# Patient Record
Sex: FEMALE | Race: WHITE | NOT HISPANIC OR LATINO
[De-identification: names, ages, dates, MRNs, and addresses within clinical notes are randomized per-mention and may not be internally consistent; named-entity substitution may affect disease eponyms.]

---

## 2017-01-03 ENCOUNTER — APPOINTMENT (OUTPATIENT)
Dept: PULMONOLOGY | Facility: CLINIC | Age: 82
End: 2017-01-03

## 2017-01-03 VITALS
OXYGEN SATURATION: 97 % | HEIGHT: 64 IN | SYSTOLIC BLOOD PRESSURE: 135 MMHG | HEART RATE: 82 BPM | DIASTOLIC BLOOD PRESSURE: 70 MMHG | BODY MASS INDEX: 30.9 KG/M2 | TEMPERATURE: 98.8 F | WEIGHT: 181 LBS

## 2017-01-03 DIAGNOSIS — R94.2 ABNORMAL RESULTS OF PULMONARY FUNCTION STUDIES: ICD-10-CM

## 2017-01-12 ENCOUNTER — FORM ENCOUNTER (OUTPATIENT)
Age: 82
End: 2017-01-12

## 2017-01-13 ENCOUNTER — OUTPATIENT (OUTPATIENT)
Dept: OUTPATIENT SERVICES | Facility: HOSPITAL | Age: 82
LOS: 1 days | End: 2017-01-13
Payer: MEDICARE

## 2017-01-13 DIAGNOSIS — R06.02 SHORTNESS OF BREATH: ICD-10-CM

## 2017-01-13 PROCEDURE — 93320 DOPPLER ECHO COMPLETE: CPT | Mod: 26

## 2017-01-13 PROCEDURE — 93350 STRESS TTE ONLY: CPT | Mod: 26

## 2017-01-13 PROCEDURE — 93351 STRESS TTE COMPLETE: CPT

## 2017-01-13 PROCEDURE — 93018 CV STRESS TEST I&R ONLY: CPT

## 2017-01-13 PROCEDURE — 93325 DOPPLER ECHO COLOR FLOW MAPG: CPT | Mod: 26

## 2017-01-13 PROCEDURE — 93016 CV STRESS TEST SUPVJ ONLY: CPT

## 2017-01-17 ENCOUNTER — RESULT REVIEW (OUTPATIENT)
Age: 82
End: 2017-01-17

## 2017-01-17 VITALS
SYSTOLIC BLOOD PRESSURE: 179 MMHG | HEART RATE: 84 BPM | OXYGEN SATURATION: 94 % | RESPIRATION RATE: 16 BRPM | TEMPERATURE: 98 F | WEIGHT: 182.1 LBS | HEIGHT: 64 IN | DIASTOLIC BLOOD PRESSURE: 87 MMHG

## 2017-01-17 RX ORDER — SITAGLIPTIN 50 MG/1
1 TABLET, FILM COATED ORAL
Qty: 0 | Refills: 0 | COMMUNITY

## 2017-01-17 RX ORDER — CHLORHEXIDINE GLUCONATE 213 G/1000ML
1 SOLUTION TOPICAL ONCE
Qty: 0 | Refills: 0 | Status: DISCONTINUED | OUTPATIENT
Start: 2017-01-18 | End: 2017-01-19

## 2017-01-17 NOTE — H&P ADULT. - ASSESSMENT
82 y/o F former smoker(quit 20 years ago), strong family h/o heart disease(daughter recently passed away from MI), HLD, DM, Breast CA s/p R lumpectomy in 2013, Uterine CA s/p ZEENAT in past, TIA in 2013(no residual deficits) who presented to her Cardiologist Dr. Stanton with increased PAYNE over past month.  Pt reports symptoms began 2-3 years ago and she had NST at that time which was normal but symptoms have worsened.  She was instructed to use an inhaler but has not picked inhaler up.  She reports symptoms occur with ambulation of 1 flight of stairs and are relieved with rest.  She denies any chest pain, palpitations, LE edema, PND, orthopnea.  She underwent a CTA PE protocol and LE duplexes last month which were negative for DVT/PE.  On 1/13/17 she underwent a Stress Echo which revealed LV wall motion nml, EF 60%, with LVEF to 45% with stress and stress induced wall motion with akinesis of apex, apical septum and mid inferoseptum and mid anteroseptal wall highly suggestive of exercise related ischemia in LAD distribution. In light of patient's CCS Class III Anginal Equivalent symptoms in setting of abnormal Stress Echo she now presents for recommended Cardiac Catheterization with intervention if clinically indicated.    Pt doesn't take Aspirin or Plavix at home. Pt does not take any medications for blood pressure. Pt's labs stable. Pt loaded with Aspirin 325mg and Plavix 600mg prior to cardiac cath. 82 y/o F former smoker(quit 20 years ago), strong family h/o heart disease(daughter recently passed away from MI), HLD, DM, Breast CA s/p R lumpectomy in 2013, Uterine CA s/p ZEENAT in past, TIA in 2013(no residual deficits) who presented to her Cardiologist Dr. Stanton with increased PAYNE over past month.  Pt reports symptoms began 2-3 years ago and she had NST at that time which was normal but symptoms have worsened.  She was instructed to use an inhaler but has not picked inhaler up.  She reports symptoms occur with ambulation of 1 flight of stairs and are relieved with rest.  She denies any chest pain, palpitations, LE edema, PND, orthopnea.  She underwent a CTA PE protocol and LE duplexes last month which were negative for DVT/PE.  On 1/13/17 she underwent a Stress Echo which revealed LV wall motion nml, EF 60%, with LVEF to 45% with stress and stress induced wall motion with akinesis of apex, apical septum and mid inferoseptum and mid anteroseptal wall highly suggestive of exercise related ischemia in LAD distribution. In light of patient's CCS Class III Anginal Equivalent symptoms in setting of abnormal Stress Echo she now presents for recommended Cardiac Catheterization with intervention if clinically indicated.    Pt doesn't take Aspirin or Plavix at home. Pt does not take any medications for blood pressure. Pt's labs stable. Pt loaded with Aspirin 325mg and Plavix 600mg prior to cardiac cath.    ASA III, Mallampati III

## 2017-01-17 NOTE — H&P ADULT. - PMH
Breast CA  s/p lumpectomy on right  Cataracts, bilateral    DM (diabetes mellitus)    HLD (hyperlipidemia)

## 2017-01-17 NOTE — H&P ADULT. - FAMILY HISTORY
Father  Still living? No  Family history of heart attack, Age at diagnosis: 71-80     Child  Still living? No  Family history of heart attack, Age at diagnosis: 51-60     Sibling  Still living? Yes, Estimated age: Age Unknown  Family history of heart valve abnormality, Age at diagnosis: 71-80

## 2017-01-18 ENCOUNTER — INPATIENT (INPATIENT)
Facility: HOSPITAL | Age: 82
LOS: 0 days | Discharge: HOME | DRG: 247 | End: 2017-01-19
Attending: INTERNAL MEDICINE | Admitting: INTERNAL MEDICINE
Payer: COMMERCIAL

## 2017-01-18 DIAGNOSIS — Z90.11 ACQUIRED ABSENCE OF RIGHT BREAST AND NIPPLE: Chronic | ICD-10-CM

## 2017-01-18 DIAGNOSIS — Z90.710 ACQUIRED ABSENCE OF BOTH CERVIX AND UTERUS: Chronic | ICD-10-CM

## 2017-01-18 LAB
ALBUMIN SERPL ELPH-MCNC: 3.8 G/DL — SIGNIFICANT CHANGE UP (ref 3.4–5)
ALP SERPL-CCNC: 80 U/L — SIGNIFICANT CHANGE UP (ref 40–120)
ALT FLD-CCNC: 22 U/L — SIGNIFICANT CHANGE UP (ref 12–42)
ANION GAP SERPL CALC-SCNC: 7 MMOL/L — LOW (ref 9–16)
APTT BLD: 32.1 SEC — SIGNIFICANT CHANGE UP (ref 27.5–37.4)
AST SERPL-CCNC: 20 U/L — SIGNIFICANT CHANGE UP (ref 15–37)
BASOPHILS NFR BLD AUTO: 0.5 % — SIGNIFICANT CHANGE UP (ref 0–2)
BILIRUB SERPL-MCNC: 0.3 MG/DL — SIGNIFICANT CHANGE UP (ref 0.2–1.2)
BUN SERPL-MCNC: 26 MG/DL — HIGH (ref 7–23)
CALCIUM SERPL-MCNC: 9 MG/DL — SIGNIFICANT CHANGE UP (ref 8.5–10.5)
CHLORIDE SERPL-SCNC: 107 MMOL/L — SIGNIFICANT CHANGE UP (ref 96–108)
CHOLEST SERPL-MCNC: 171 MG/DL — SIGNIFICANT CHANGE UP
CO2 SERPL-SCNC: 25 MMOL/L — SIGNIFICANT CHANGE UP (ref 22–31)
CREAT SERPL-MCNC: 0.94 MG/DL — SIGNIFICANT CHANGE UP (ref 0.5–1.3)
CRP SERPL-MCNC: <0.29 MG/DL — SIGNIFICANT CHANGE UP
EOSINOPHIL NFR BLD AUTO: 1.2 % — SIGNIFICANT CHANGE UP (ref 0–6)
GLUCOSE SERPL-MCNC: 206 MG/DL — HIGH (ref 70–99)
HBA1C BLD-MCNC: 7.7 % — HIGH (ref 4.8–5.6)
HCT VFR BLD CALC: 40.3 % — SIGNIFICANT CHANGE UP (ref 34.5–45)
HDLC SERPL-MCNC: 80 MG/DL — SIGNIFICANT CHANGE UP
HGB BLD-MCNC: 13.2 G/DL — SIGNIFICANT CHANGE UP (ref 11.5–15.5)
INR BLD: 0.98 — SIGNIFICANT CHANGE UP (ref 0.88–1.16)
LIPID PNL WITH DIRECT LDL SERPL: 74 MG/DL — SIGNIFICANT CHANGE UP
LYMPHOCYTES # BLD AUTO: 21.6 % — SIGNIFICANT CHANGE UP (ref 13–44)
MCHC RBC-ENTMCNC: 29.1 PG — SIGNIFICANT CHANGE UP (ref 27–34)
MCHC RBC-ENTMCNC: 32.8 G/DL — SIGNIFICANT CHANGE UP (ref 32–36)
MCV RBC AUTO: 88.8 FL — SIGNIFICANT CHANGE UP (ref 80–100)
MONOCYTES NFR BLD AUTO: 4.6 % — SIGNIFICANT CHANGE UP (ref 2–14)
NEUTROPHILS NFR BLD AUTO: 72.1 % — SIGNIFICANT CHANGE UP (ref 43–77)
PLATELET # BLD AUTO: 311 K/UL — SIGNIFICANT CHANGE UP (ref 150–400)
POTASSIUM SERPL-MCNC: 4.4 MMOL/L — SIGNIFICANT CHANGE UP (ref 3.5–5.3)
POTASSIUM SERPL-SCNC: 4.4 MMOL/L — SIGNIFICANT CHANGE UP (ref 3.5–5.3)
PROT SERPL-MCNC: 7.4 G/DL — SIGNIFICANT CHANGE UP (ref 6.4–8.2)
PROTHROM AB SERPL-ACNC: 10.9 SEC — SIGNIFICANT CHANGE UP (ref 10–13.1)
RBC # BLD: 4.54 M/UL — SIGNIFICANT CHANGE UP (ref 3.8–5.2)
RBC # FLD: 12.7 % — SIGNIFICANT CHANGE UP (ref 10.3–16.9)
SODIUM SERPL-SCNC: 139 MMOL/L — SIGNIFICANT CHANGE UP (ref 135–145)
TOTAL CHOLESTEROL/HDL RATIO MEASUREMENT: 2.1 RATIO — SIGNIFICANT CHANGE UP
TRIGL SERPL-MCNC: 86 MG/DL — SIGNIFICANT CHANGE UP
WBC # BLD: 8.2 K/UL — SIGNIFICANT CHANGE UP (ref 3.8–10.5)
WBC # FLD AUTO: 8.2 K/UL — SIGNIFICANT CHANGE UP (ref 3.8–10.5)

## 2017-01-18 PROCEDURE — 93458 L HRT ARTERY/VENTRICLE ANGIO: CPT | Mod: 26,XU

## 2017-01-18 PROCEDURE — 92928 PRQ TCAT PLMT NTRAC ST 1 LES: CPT | Mod: LD

## 2017-01-18 PROCEDURE — 93010 ELECTROCARDIOGRAM REPORT: CPT

## 2017-01-18 RX ORDER — GLUCAGON INJECTION, SOLUTION 0.5 MG/.1ML
1 INJECTION, SOLUTION SUBCUTANEOUS ONCE
Qty: 0 | Refills: 0 | Status: DISCONTINUED | OUTPATIENT
Start: 2017-01-18 | End: 2017-01-19

## 2017-01-18 RX ORDER — SODIUM CHLORIDE 9 MG/ML
1000 INJECTION, SOLUTION INTRAVENOUS
Qty: 0 | Refills: 0 | Status: DISCONTINUED | OUTPATIENT
Start: 2017-01-18 | End: 2017-01-19

## 2017-01-18 RX ORDER — ASPIRIN/CALCIUM CARB/MAGNESIUM 324 MG
81 TABLET ORAL DAILY
Qty: 0 | Refills: 0 | Status: DISCONTINUED | OUTPATIENT
Start: 2017-01-19 | End: 2017-01-19

## 2017-01-18 RX ORDER — CLOPIDOGREL BISULFATE 75 MG/1
600 TABLET, FILM COATED ORAL ONCE
Qty: 0 | Refills: 0 | Status: COMPLETED | OUTPATIENT
Start: 2017-01-18 | End: 2017-01-18

## 2017-01-18 RX ORDER — DEXTROSE 50 % IN WATER 50 %
1 SYRINGE (ML) INTRAVENOUS ONCE
Qty: 0 | Refills: 0 | Status: DISCONTINUED | OUTPATIENT
Start: 2017-01-18 | End: 2017-01-19

## 2017-01-18 RX ORDER — LABETALOL HCL 100 MG
10 TABLET ORAL ONCE
Qty: 0 | Refills: 0 | Status: COMPLETED | OUTPATIENT
Start: 2017-01-18 | End: 2017-01-18

## 2017-01-18 RX ORDER — SODIUM CHLORIDE 9 MG/ML
500 INJECTION INTRAMUSCULAR; INTRAVENOUS; SUBCUTANEOUS
Qty: 0 | Refills: 0 | Status: DISCONTINUED | OUTPATIENT
Start: 2017-01-18 | End: 2017-01-18

## 2017-01-18 RX ORDER — CLOPIDOGREL BISULFATE 75 MG/1
75 TABLET, FILM COATED ORAL DAILY
Qty: 0 | Refills: 0 | Status: DISCONTINUED | OUTPATIENT
Start: 2017-01-19 | End: 2017-01-19

## 2017-01-18 RX ORDER — ATORVASTATIN CALCIUM 80 MG/1
10 TABLET, FILM COATED ORAL AT BEDTIME
Qty: 0 | Refills: 0 | Status: DISCONTINUED | OUTPATIENT
Start: 2017-01-18 | End: 2017-01-19

## 2017-01-18 RX ORDER — DEXTROSE 50 % IN WATER 50 %
25 SYRINGE (ML) INTRAVENOUS ONCE
Qty: 0 | Refills: 0 | Status: DISCONTINUED | OUTPATIENT
Start: 2017-01-18 | End: 2017-01-19

## 2017-01-18 RX ORDER — SODIUM CHLORIDE 9 MG/ML
500 INJECTION INTRAMUSCULAR; INTRAVENOUS; SUBCUTANEOUS
Qty: 0 | Refills: 0 | Status: DISCONTINUED | OUTPATIENT
Start: 2017-01-18 | End: 2017-01-19

## 2017-01-18 RX ORDER — ASPIRIN/CALCIUM CARB/MAGNESIUM 324 MG
325 TABLET ORAL ONCE
Qty: 0 | Refills: 0 | Status: COMPLETED | OUTPATIENT
Start: 2017-01-18 | End: 2017-01-18

## 2017-01-18 RX ORDER — INSULIN LISPRO 100/ML
VIAL (ML) SUBCUTANEOUS
Qty: 0 | Refills: 0 | Status: DISCONTINUED | OUTPATIENT
Start: 2017-01-18 | End: 2017-01-19

## 2017-01-18 RX ORDER — DEXTROSE 50 % IN WATER 50 %
12.5 SYRINGE (ML) INTRAVENOUS ONCE
Qty: 0 | Refills: 0 | Status: DISCONTINUED | OUTPATIENT
Start: 2017-01-18 | End: 2017-01-19

## 2017-01-18 RX ADMIN — SODIUM CHLORIDE 50 MILLILITER(S): 9 INJECTION INTRAMUSCULAR; INTRAVENOUS; SUBCUTANEOUS at 09:19

## 2017-01-18 RX ADMIN — Medication 10 MILLIGRAM(S): at 12:02

## 2017-01-18 RX ADMIN — CLOPIDOGREL BISULFATE 600 MILLIGRAM(S): 75 TABLET, FILM COATED ORAL at 09:19

## 2017-01-18 RX ADMIN — SODIUM CHLORIDE 75 MILLILITER(S): 9 INJECTION INTRAMUSCULAR; INTRAVENOUS; SUBCUTANEOUS at 12:03

## 2017-01-18 RX ADMIN — Medication 325 MILLIGRAM(S): at 09:19

## 2017-01-18 NOTE — PROGRESS NOTE ADULT - SUBJECTIVE AND OBJECTIVE BOX
Procedure: LHC, SEAN of mLAD  Indication: CAD, +EST  Complication: none    Result:  1) 3 V CAD ( 90%mLAD, 50% oLCX, 60% oRCA)  2) Nl LVF EF 60% LVEDP 11  3) Successful SEAN of mLAD    Plan: Admit for IV hydration given GFR<60 and advanced age.  Plavix + ASA x 1 year. To f/u with Dr. Stanton.

## 2017-01-19 ENCOUNTER — TRANSCRIPTION ENCOUNTER (OUTPATIENT)
Age: 82
End: 2017-01-19

## 2017-01-19 VITALS — RESPIRATION RATE: 15 BRPM | SYSTOLIC BLOOD PRESSURE: 154 MMHG | HEART RATE: 89 BPM | DIASTOLIC BLOOD PRESSURE: 63 MMHG

## 2017-01-19 DIAGNOSIS — I10 ESSENTIAL (PRIMARY) HYPERTENSION: ICD-10-CM

## 2017-01-19 DIAGNOSIS — E11.59 TYPE 2 DIABETES MELLITUS WITH OTHER CIRCULATORY COMPLICATIONS: ICD-10-CM

## 2017-01-19 DIAGNOSIS — E78.01 FAMILIAL HYPERCHOLESTEROLEMIA: ICD-10-CM

## 2017-01-19 DIAGNOSIS — I25.10 ATHEROSCLEROTIC HEART DISEASE OF NATIVE CORONARY ARTERY WITHOUT ANGINA PECTORIS: ICD-10-CM

## 2017-01-19 LAB
ANION GAP SERPL CALC-SCNC: 11 MMOL/L — SIGNIFICANT CHANGE UP (ref 9–16)
BUN SERPL-MCNC: 20 MG/DL — SIGNIFICANT CHANGE UP (ref 7–23)
CALCIUM SERPL-MCNC: 8.3 MG/DL — LOW (ref 8.5–10.5)
CHLORIDE SERPL-SCNC: 106 MMOL/L — SIGNIFICANT CHANGE UP (ref 96–108)
CO2 SERPL-SCNC: 23 MMOL/L — SIGNIFICANT CHANGE UP (ref 22–31)
CREAT SERPL-MCNC: 0.88 MG/DL — SIGNIFICANT CHANGE UP (ref 0.5–1.3)
GLUCOSE SERPL-MCNC: 158 MG/DL — HIGH (ref 70–99)
HCT VFR BLD CALC: 35 % — SIGNIFICANT CHANGE UP (ref 34.5–45)
HGB BLD-MCNC: 11.6 G/DL — SIGNIFICANT CHANGE UP (ref 11.5–15.5)
MAGNESIUM SERPL-MCNC: 1.6 MG/DL — SIGNIFICANT CHANGE UP (ref 1.6–2.4)
MCHC RBC-ENTMCNC: 29.1 PG — SIGNIFICANT CHANGE UP (ref 27–34)
MCHC RBC-ENTMCNC: 33.1 G/DL — SIGNIFICANT CHANGE UP (ref 32–36)
MCV RBC AUTO: 87.7 FL — SIGNIFICANT CHANGE UP (ref 80–100)
PLATELET # BLD AUTO: 262 K/UL — SIGNIFICANT CHANGE UP (ref 150–400)
POTASSIUM SERPL-MCNC: 4.3 MMOL/L — SIGNIFICANT CHANGE UP (ref 3.5–5.3)
POTASSIUM SERPL-SCNC: 4.3 MMOL/L — SIGNIFICANT CHANGE UP (ref 3.5–5.3)
RBC # BLD: 3.99 M/UL — SIGNIFICANT CHANGE UP (ref 3.8–5.2)
RBC # FLD: 12.4 % — SIGNIFICANT CHANGE UP (ref 10.3–16.9)
SODIUM SERPL-SCNC: 140 MMOL/L — SIGNIFICANT CHANGE UP (ref 135–145)
WBC # BLD: 8 K/UL — SIGNIFICANT CHANGE UP (ref 3.8–10.5)
WBC # FLD AUTO: 8 K/UL — SIGNIFICANT CHANGE UP (ref 3.8–10.5)

## 2017-01-19 PROCEDURE — 83735 ASSAY OF MAGNESIUM: CPT

## 2017-01-19 PROCEDURE — C1889: CPT

## 2017-01-19 PROCEDURE — 80053 COMPREHEN METABOLIC PANEL: CPT

## 2017-01-19 PROCEDURE — 85730 THROMBOPLASTIN TIME PARTIAL: CPT

## 2017-01-19 PROCEDURE — 86140 C-REACTIVE PROTEIN: CPT

## 2017-01-19 PROCEDURE — C1725: CPT

## 2017-01-19 PROCEDURE — 80048 BASIC METABOLIC PNL TOTAL CA: CPT

## 2017-01-19 PROCEDURE — 85610 PROTHROMBIN TIME: CPT

## 2017-01-19 PROCEDURE — 83036 HEMOGLOBIN GLYCOSYLATED A1C: CPT

## 2017-01-19 PROCEDURE — C1760: CPT

## 2017-01-19 PROCEDURE — C1874: CPT

## 2017-01-19 PROCEDURE — 36415 COLL VENOUS BLD VENIPUNCTURE: CPT

## 2017-01-19 PROCEDURE — 85027 COMPLETE CBC AUTOMATED: CPT

## 2017-01-19 PROCEDURE — C1769: CPT

## 2017-01-19 PROCEDURE — 85025 COMPLETE CBC W/AUTO DIFF WBC: CPT

## 2017-01-19 PROCEDURE — 93005 ELECTROCARDIOGRAM TRACING: CPT

## 2017-01-19 PROCEDURE — C1894: CPT

## 2017-01-19 PROCEDURE — 80061 LIPID PANEL: CPT

## 2017-01-19 PROCEDURE — C1887: CPT

## 2017-01-19 RX ORDER — MAGNESIUM OXIDE 400 MG ORAL TABLET 241.3 MG
400 TABLET ORAL ONCE
Qty: 0 | Refills: 0 | Status: COMPLETED | OUTPATIENT
Start: 2017-01-19 | End: 2017-01-19

## 2017-01-19 RX ORDER — ATORVASTATIN CALCIUM 80 MG/1
40 TABLET, FILM COATED ORAL AT BEDTIME
Qty: 0 | Refills: 0 | Status: DISCONTINUED | OUTPATIENT
Start: 2017-01-19 | End: 2017-01-19

## 2017-01-19 RX ORDER — ASPIRIN/CALCIUM CARB/MAGNESIUM 324 MG
1 TABLET ORAL
Qty: 30 | Refills: 11 | OUTPATIENT
Start: 2017-01-19 | End: 2018-01-13

## 2017-01-19 RX ORDER — CLOPIDOGREL BISULFATE 75 MG/1
1 TABLET, FILM COATED ORAL
Qty: 30 | Refills: 11 | OUTPATIENT
Start: 2017-01-19 | End: 2018-01-13

## 2017-01-19 RX ORDER — METFORMIN HYDROCHLORIDE 850 MG/1
1 TABLET ORAL
Qty: 0 | Refills: 0 | COMMUNITY

## 2017-01-19 RX ORDER — ATORVASTATIN CALCIUM 80 MG/1
1 TABLET, FILM COATED ORAL
Qty: 30 | Refills: 3 | OUTPATIENT
Start: 2017-01-19 | End: 2017-05-18

## 2017-01-19 RX ORDER — ATORVASTATIN CALCIUM 80 MG/1
1 TABLET, FILM COATED ORAL
Qty: 0 | Refills: 0 | COMMUNITY

## 2017-01-19 RX ADMIN — MAGNESIUM OXIDE 400 MG ORAL TABLET 400 MILLIGRAM(S): 241.3 TABLET ORAL at 09:46

## 2017-01-19 RX ADMIN — Medication 4: at 12:07

## 2017-01-19 RX ADMIN — Medication 81 MILLIGRAM(S): at 09:46

## 2017-01-19 RX ADMIN — CLOPIDOGREL BISULFATE 75 MILLIGRAM(S): 75 TABLET, FILM COATED ORAL at 09:46

## 2017-01-19 NOTE — DISCHARGE NOTE ADULT - MEDICATION SUMMARY - MEDICATIONS TO CHANGE
I will SWITCH the dose or number of times a day I take the medications listed below when I get home from the hospital:    metFORMIN 1000 mg oral tablet, extended release  -- 1 tab(s) by mouth once a day

## 2017-01-19 NOTE — DISCHARGE NOTE ADULT - MEDICATION SUMMARY - MEDICATIONS TO TAKE
I will START or STAY ON the medications listed below when I get home from the hospital:    aspirin 81 mg oral delayed release tablet  -- 1 tab(s) by mouth once a day  -- Indication: For Coronary Artery Disease    Januvia 100 mg oral tablet  -- 1 tab(s) by mouth once a day  -- Indication: For Diabetes    metFORMIN 1000 mg oral tablet, extended release  -- 1 tab(s) by mouth once a day.    Hold Metformin for 48 hours and restart on 1/21/17  -- Indication: For Diabetes    Lipitor 10 mg oral tablet  -- 1 tab(s) by mouth once a day  -- Indication: For Hyperlipidemia    clopidogrel 75 mg oral tablet  -- 1 tab(s) by mouth once a day  -- Indication: For Coronary Artery Disease

## 2017-01-19 NOTE — DISCHARGE NOTE ADULT - ADDITIONAL INSTRUCTIONS
Please follow up with Dr. Stanton in 1-2 weeks    You underwent a coronary angiogram and should wait 3 days before returning to ordinary activities. Do not drive for 2 days. Consult your doctor before returning to vigorous activity. You may return to work in 3-5 days.  You may shower once the dressing is removed, but avoid baths, hot tubs, or swimming for 5 days to prevent infection. If you notice bleeding from the site, hardening and pain at the site, drainage or redness from the site, coolness/paleness of the extremity, swelling, or fever, please call [phone]. Please continue your aspirin and plavix as prescribed unless otherwise indicated by your cardiologist. All of your prescriptions have been sent to the pharmacy. Please follow up with Dr. Griffin in 1-2 weeks. All of your needed prescriptions have been sent electronically to your pharmacy.

## 2017-01-19 NOTE — DISCHARGE NOTE ADULT - CARE PLAN
Principal Discharge DX:	CAD (coronary artery disease)  Goal:	continue Aspirin EC 81mg daily and Plavix 75mg daily  Instructions for follow-up, activity and diet:	You had underwent successful Angioplasty and placement of Drug Eluting Stent to your Left Anterior Descending   	Continue taking Aspirin 81mg daily and Plavix 75mg daily.  Do NOT stop these medications unless instructed to do so by your Cardiologist.   Continue other home medications.  Follow up with your Cardiologist in office in 1-2 weeks.  Secondary Diagnosis:	HLD (hyperlipidemia)  Secondary Diagnosis:	HTN (hypertension)  Secondary Diagnosis:	Type 2 diabetes mellitus with other circulatory complication Principal Discharge DX:	CAD (coronary artery disease)  Goal:	continue Aspirin EC 81mg daily and Plavix 75mg daily  Instructions for follow-up, activity and diet:	You had underwent successful Angioplasty and placement of Drug Eluting Stent to your Left Anterior Descending. Continue taking Aspirin 81mg daily and Plavix 75mg daily.  Do NOT stop these medications unless instructed to do so by your Cardiologist.   Continue other home medications.  Follow up with your Cardiologist in office in 1-2 weeks.  Secondary Diagnosis:	HLD (hyperlipidemia)  Goal:	Continue Atorvastatin 10mg daily  Secondary Diagnosis:	HTN (hypertension)  Instructions for follow-up, activity and diet:	You were found with Systolic Blood Pressure ranging 180's.  Continue to frank  Secondary Diagnosis:	Type 2 diabetes mellitus with other circulatory complication  Goal:	Hold Metformin for 48 hours and restart on 1/21/17.  Continue Januvia Principal Discharge DX:	CAD (coronary artery disease)  Goal:	continue Aspirin EC 81mg daily and Plavix 75mg daily  Instructions for follow-up, activity and diet:	You had underwent successful Angioplasty and placement of Drug Eluting Stent to your Left Anterior Descending. Continue taking Aspirin 81mg daily and Plavix 75mg daily.  Do NOT stop these medications unless instructed to do so by your Cardiologist.   Continue other home medications.  Follow up with your Cardiologist in office in 1-2 weeks.  Secondary Diagnosis:	HLD (hyperlipidemia)  Goal:	Your Atorvastatin was increased to Atorvastatin 4 0mg daily to protect your heart and prevent further plaque build up  Secondary Diagnosis:	HTN (hypertension)  Instructions for follow-up, activity and diet:	You were found with Systolic Blood Pressure ranging 170 - 180's.  You were not started on blood pressure medications at this time; however, it is recommended that you adhere to low salt, low fat and low cholesterol diet.  Please follow up with cardiologist in [place] in 1  Secondary Diagnosis:	Type 2 diabetes mellitus with other circulatory complication  Goal:	Hold Metformin for 48 hours and restart on 1/21/17.  Continue Januvia

## 2017-01-19 NOTE — DISCHARGE NOTE ADULT - CARE PROVIDER_API CALL
Ervin Stanton (MD), Cardiovascular Disease; Internal Medicine  [address] [place] [address]  Phone: [phone]  Fax: [phone]

## 2017-01-19 NOTE — DISCHARGE NOTE ADULT - NS MD DC FALL RISK RISK
For information on Fall & Injury Prevention, visit [email or web address]

## 2017-01-19 NOTE — PROGRESS NOTE ADULT - PROBLEM SELECTOR PLAN 4
BPs are running high here but as an outpt she has better BPs.  If they stay elevated would add an ACE I.

## 2017-01-19 NOTE — DISCHARGE NOTE ADULT - SECONDARY DIAGNOSIS.
HLD (hyperlipidemia) HTN (hypertension) Type 2 diabetes mellitus with other circulatory complication

## 2017-01-19 NOTE — DISCHARGE NOTE ADULT - HOSPITAL COURSE
84 y/o F former smoker(quit 20 years ago), strong family h/o heart disease(daughter recently passed away from MI), HLD, DM, Breast CA s/p R lumpectomy in 2013, Uterine CA s/p ZEENAT in past, TIA in 2013(no residual deficits) who presented to her Cardiologist Dr. Stanton with increased PAYNE over past month.  Pt reports symptoms began 2-3 years ago and she had NST at that time which was normal but symptoms have worsened.  She was instructed to use an inhaler but has not picked inhaler up.  She reports symptoms occur with ambulation of 1 flight of stairs and are relieved with rest.  She denies any chest pain, palpitations, LE edema, PND, orthopnea.  She underwent a CTA PE protocol and LE duplexes last month which were negative for DVT/PE.  On 1/13/17 she underwent a Stress Echo which revealed LV wall motion nml, EF 60%, with LVEF to 45% with stress and stress induced wall motion with akinesis of apex, apical septum and mid inferoseptum and mid anteroseptal wall highly suggestive of exercise related ischemia in LAD distribution.  Pt doesn't take Aspirin or Plavix at home. Pt does not take any medications for blood pressure.   Pt underwent Cardiac Cath 1/18/17:  SEAN mLAD with residual 50% oLcx, 60% oRCA).  Post cath BP elevated to 190/89(SBP>200 in room) given Labetalol 10mg IVP x 1 dose post(HR 80)-pt asx. BP improved to 140s/60s.    Post procedure pt remained asymptomatic denying CP, SOB, palpitations, N/V, bleeding/bruising from access site.  Pt was able to void and ambulate without difficulty.  Labs and vitals remained stable overnight. Pt deemed stable for d/c today per Dr. Stanton.  Rx sent to outpatient pharmancy for ASA/Plavix 82 y/o F former smoker(quit 20 years ago), strong family h/o heart disease(daughter recently passed away from MI), HLD, DM, Breast CA s/p R lumpectomy in 2013, Uterine CA s/p ZEENAT in past, TIA in 2013(no residual deficits) who presented to her Cardiologist Dr. Stanton with increased PAYNE over past month.  Pt reports symptoms began 2-3 years ago and she had NST at that time which was normal but symptoms have worsened.  She was instructed to use an inhaler but has not picked inhaler up.  She reports symptoms occur with ambulation of 1 flight of stairs and are relieved with rest.  She denies any chest pain, palpitations, LE edema, PND, orthopnea.  She underwent a CTA PE protocol and LE duplexes last month which were negative for DVT/PE.  On 1/13/17 she underwent a Stress Echo which revealed LV wall motion nml, EF 60%, with LVEF to 45% with stress and stress induced wall motion with akinesis of apex, apical septum and mid inferoseptum and mid anteroseptal wall highly suggestive of exercise related ischemia in LAD distribution.  Pt doesn't take Aspirin or Plavix at home. Pt does not take any medications for blood pressure.   Pt underwent Cardiac Cath 1/18/17:  SEAN mLAD with residual 50% oLcx, 60% oRCA).  Post cath BP elevated to 190/89(SBP>200 in room) given Labetalol 10mg IVP x 1 dose post(HR 80)-pt asx. BP improved to 140s/60s.    Post procedure pt remained asymptomatic denying CP, SOB, palpitations, N/V, bleeding/bruising from access site.  Pt was able to void and ambulate without difficulty.  Labs and vitals remained stable overnight. Pt deemed stable for d/c today per Dr. Stanton.  Rx sent to outpatient pharmancy for ASA/Plavix.

## 2017-01-19 NOTE — PROVIDER CONTACT NOTE (OTHER) - SITUATION
Pt is post cath, right groin stable at change of shift. Gauze dsg dry and intact at 8pm.  Q1HR groin checks performed.  At 330am, small bloody stain noted, outlined, and progressed post pt ambulation.

## 2017-01-19 NOTE — PROVIDER CONTACT NOTE (OTHER) - ASSESSMENT
Q1hr groin checks performed, pt was ambulatory and groin remained stable until 330a, noted to have slight ooze to dsg.  No hematoma, no active bleed.

## 2017-01-19 NOTE — DISCHARGE NOTE ADULT - PLAN OF CARE
continue Aspirin EC 81mg daily and Plavix 75mg daily You had underwent successful Angioplasty and placement of Drug Eluting Stent to your Left Anterior Descending   	Continue taking Aspirin 81mg daily and Plavix 75mg daily.  Do NOT stop these medications unless instructed to do so by your Cardiologist.   Continue other home medications.  Follow up with your Cardiologist in office in 1-2 weeks. Hold Metformin for 48 hours and restart on 1/21/17.  Continue Januvia You were found with Systolic Blood Pressure ranging 180's.  Continue to frank Continue Atorvastatin 10mg daily You had underwent successful Angioplasty and placement of Drug Eluting Stent to your Left Anterior Descending. Continue taking Aspirin 81mg daily and Plavix 75mg daily.  Do NOT stop these medications unless instructed to do so by your Cardiologist.   Continue other home medications.  Follow up with your Cardiologist in office in 1-2 weeks. You were found with Systolic Blood Pressure ranging 170 - 180's.  You were not started on blood pressure medications at this time; however, it is recommended that you adhere to low salt, low fat and low cholesterol diet.  Please follow up with cardiologist in [place] in 1 Your Atorvastatin was increased to Atorvastatin 4 0mg daily to protect your heart and prevent further plaque build up

## 2017-01-19 NOTE — PROGRESS NOTE ADULT - SUBJECTIVE AND OBJECTIVE BOX
INTERVAL HISTORY:  s/p cath and stent	  MEDICATIONS:            insulin lispro (HumaLOG) corrective regimen sliding scale  SubCutaneous Before meals and at bedtime  dextrose Gel 1Dose(s) Oral once PRN  dextrose 50% Injectable 12.5Gram(s) IV Push once  dextrose 50% Injectable 25Gram(s) IV Push once  dextrose 50% Injectable 25Gram(s) IV Push once  glucagon  Injectable 1milliGRAM(s) IntraMuscular once PRN  atorvastatin 40milliGRAM(s) Oral at bedtime    chlorhexidine 4% Liquid 1Application(s) Topical once  sodium chloride 0.9%. 500milliLiter(s) IV Continuous <Continuous>  aspirin enteric coated 81milliGRAM(s) Oral daily  clopidogrel Tablet 75milliGRAM(s) Oral daily  dextrose 5%. 1000milliLiter(s) IV Continuous <Continuous>  magnesium oxide 400milliGRAM(s) Oral once        PHYSICAL EXAM:  T(C): 36.8, Max: 37.4 (01-18 @ 21:01)  HR: 85 (74 - 86)  BP: 174/69 (135/61 - 185/76)  RR: 16 (14 - 17)  SpO2: 93% (93% - 98%)  Wt(kg): --  I&O's Summary    Height (cm): 162.6 (01-18 @ 13:55)  Weight (kg): 82.6 (01-18 @ 09:50)  BMI (kg/m2): 31.2 (01-18 @ 13:55)  BSA (m2): 1.88 (01-18 @ 13:55)    Appearance: Normal	  HEENT:   Normal oral mucosa, PERRL, EOMI	  Lymphatic: No lymphadenopathy  Cardiovascular: Normal S1 S2, No JVD, No murmurs, No edema  Respiratory: Lungs clear to auscultation	  Psychiatry: A & O x 3, Mood & affect appropriate  Gastrointestinal:  Soft, Non-tender, + BS	  Skin: No rashes, No ecchymoses, No cyanosis  Neurologic: Non-focal  Extremities: Normal range of motion, No clubbing, cyanosis or edema  Vascular: Peripheral pulses palpable 2+ bilaterally    TELEMETRY: 	    ECG:  	  RADIOLOGY:   DIAGNOSTIC TESTING:  [ ] Echocardiogram:  [ ]  Catheterization:  [ ] Stress Test:    OTHER: 	    LABS:	 	    CARDIAC MARKERS:                                  11.6   8.0   )-----------( 262      ( 19 Jan 2017 07:55 )             35.0     19 Jan 2017 07:55    140    |  106    |  20     ----------------------------<  158    4.3     |  23     |  0.88     Ca    8.3        19 Jan 2017 07:55  Mg     1.6       19 Jan 2017 07:55    TPro  7.4    /  Alb  3.8    /  TBili  0.3    /  DBili  x      /  AST  20     /  ALT  22     /  AlkPhos  80     18 Jan 2017 08:19    proBNP:   Lipid Profile:   HgA1c:   TSH:     ASSESSMENT/PLAN:

## 2017-01-19 NOTE — DISCHARGE NOTE ADULT - PATIENT PORTAL LINK FT
“You can access the [facility] Patient Portal, offered by [facility], by registering with the following website: [email or web address]”

## 2017-01-20 DIAGNOSIS — R06.00 DYSPNEA, UNSPECIFIED: ICD-10-CM

## 2017-01-20 NOTE — PROGRESS NOTE ADULT - PROBLEM SELECTOR PLAN 1
s/p LAD stent, home on ASA and plavix.
the patient's dyspnea is significantly less after the cardiac intervention. we will f/u patient in the clinic in 2 weeks. she was advised pal continue her current pulmonary meds

## 2017-01-20 NOTE — PROGRESS NOTE ADULT - SUBJECTIVE AND OBJECTIVE BOX
Note from yesterday 1/19/17    Interval Events: Clinically stable. Had cardiac cath done and three stents placed. No shortness of breath  Patient seen and examined at bedside.    MEDICATIONS:    Allergies    No Known Allergies            Vital Signs Last 24 Hrs    HR: 89 (89 - 89)  BP: 154/63 (154/63 - 154/63)  RR: 15 (15 - 15)  SpO2: 94% RA at rest        LABS:      CBC Full  -  ( 19 Jan 2017 07:55 )  WBC Count : 8.0 K/uL  Hemoglobin : 11.6 g/dL  Hematocrit : 35.0 %  Platelet Count - Automated : 262 K/uL  Mean Cell Volume : 87.7 fL  Mean Cell Hemoglobin : 29.1 pg  Mean Cell Hemoglobin Concentration : 33.1 g/dL  19 Jan 2017 07:55    140    |  106    |  20     ----------------------------<  158    4.3     |  23     |  0.88     Ca    8.3        19 Jan 2017 07:55  Mg     1.6       19 Jan 2017 07:55

## 2017-01-20 NOTE — PROGRESS NOTE ADULT - ASSESSMENT
The patient's respiratory status is stable. She has no respiratory complaints now. The cause of her dyspnea was most likely related to her cardiac condition

## 2017-01-24 DIAGNOSIS — I25.119 ATHEROSCLEROTIC HEART DISEASE OF NATIVE CORONARY ARTERY WITH UNSPECIFIED ANGINA PECTORIS: ICD-10-CM

## 2017-01-24 DIAGNOSIS — Z86.73 PERSONAL HISTORY OF TRANSIENT ISCHEMIC ATTACK (TIA), AND CEREBRAL INFARCTION WITHOUT RESIDUAL DEFICITS: ICD-10-CM

## 2017-01-24 DIAGNOSIS — E78.5 HYPERLIPIDEMIA, UNSPECIFIED: ICD-10-CM

## 2017-01-24 DIAGNOSIS — Z87.891 PERSONAL HISTORY OF NICOTINE DEPENDENCE: ICD-10-CM

## 2017-01-24 DIAGNOSIS — I10 ESSENTIAL (PRIMARY) HYPERTENSION: ICD-10-CM

## 2017-01-24 DIAGNOSIS — Z85.42 PERSONAL HISTORY OF MALIGNANT NEOPLASM OF OTHER PARTS OF UTERUS: ICD-10-CM

## 2017-01-24 DIAGNOSIS — Z85.3 PERSONAL HISTORY OF MALIGNANT NEOPLASM OF BREAST: ICD-10-CM

## 2017-01-24 DIAGNOSIS — E11.9 TYPE 2 DIABETES MELLITUS WITHOUT COMPLICATIONS: ICD-10-CM

## 2017-01-24 DIAGNOSIS — Z79.84 LONG TERM (CURRENT) USE OF ORAL HYPOGLYCEMIC DRUGS: ICD-10-CM

## 2017-06-22 ENCOUNTER — RECORD ABSTRACTING (OUTPATIENT)
Age: 82
End: 2017-06-22

## 2018-01-09 ENCOUNTER — RX RENEWAL (OUTPATIENT)
Age: 83
End: 2018-01-09

## 2018-01-09 DIAGNOSIS — M54.9 DORSALGIA, UNSPECIFIED: ICD-10-CM

## 2018-09-10 PROBLEM — H26.9 UNSPECIFIED CATARACT: Chronic | Status: ACTIVE | Noted: 2017-01-17

## 2018-09-10 PROBLEM — C50.919 MALIGNANT NEOPLASM OF UNSPECIFIED SITE OF UNSPECIFIED FEMALE BREAST: Chronic | Status: ACTIVE | Noted: 2017-01-17

## 2018-09-10 PROBLEM — E11.9 TYPE 2 DIABETES MELLITUS WITHOUT COMPLICATIONS: Chronic | Status: ACTIVE | Noted: 2017-01-17

## 2018-09-27 ENCOUNTER — APPOINTMENT (OUTPATIENT)
Dept: HEART AND VASCULAR | Facility: CLINIC | Age: 83
End: 2018-09-27
Payer: MEDICARE

## 2018-09-27 VITALS
HEART RATE: 84 BPM | OXYGEN SATURATION: 97 % | TEMPERATURE: 97.8 F | DIASTOLIC BLOOD PRESSURE: 73 MMHG | BODY MASS INDEX: 32.94 KG/M2 | SYSTOLIC BLOOD PRESSURE: 152 MMHG | WEIGHT: 178.99 LBS | HEIGHT: 61.81 IN

## 2018-09-27 DIAGNOSIS — R06.02 SHORTNESS OF BREATH: ICD-10-CM

## 2018-09-27 DIAGNOSIS — L98.9 DISORDER OF THE SKIN AND SUBCUTANEOUS TISSUE, UNSPECIFIED: ICD-10-CM

## 2018-09-27 DIAGNOSIS — J44.9 CHRONIC OBSTRUCTIVE PULMONARY DISEASE, UNSPECIFIED: ICD-10-CM

## 2018-09-27 DIAGNOSIS — I70.0 ATHEROSCLEROSIS OF AORTA: ICD-10-CM

## 2018-09-27 PROCEDURE — 36415 COLL VENOUS BLD VENIPUNCTURE: CPT

## 2018-09-27 PROCEDURE — 93000 ELECTROCARDIOGRAM COMPLETE: CPT

## 2018-09-27 PROCEDURE — 99215 OFFICE O/P EST HI 40 MIN: CPT

## 2018-09-28 LAB
ALBUMIN SERPL ELPH-MCNC: 4.6 G/DL
ALP BLD-CCNC: 68 U/L
ALT SERPL-CCNC: 13 U/L
ANION GAP SERPL CALC-SCNC: 15 MMOL/L
AST SERPL-CCNC: 18 U/L
BASOPHILS # BLD AUTO: 0.02 K/UL
BASOPHILS NFR BLD AUTO: 0.2 %
BILIRUB SERPL-MCNC: 0.2 MG/DL
BUN SERPL-MCNC: 21 MG/DL
CALCIUM SERPL-MCNC: 9.9 MG/DL
CHLORIDE SERPL-SCNC: 104 MMOL/L
CHOLEST SERPL-MCNC: 136 MG/DL
CHOLEST/HDLC SERPL: 2.2 RATIO
CO2 SERPL-SCNC: 23 MMOL/L
CREAT SERPL-MCNC: 1.01 MG/DL
EOSINOPHIL # BLD AUTO: 0.13 K/UL
EOSINOPHIL NFR BLD AUTO: 1.4 %
GLUCOSE SERPL-MCNC: 119 MG/DL
HBA1C MFR BLD HPLC: 8 %
HCT VFR BLD CALC: 40.4 %
HDLC SERPL-MCNC: 61 MG/DL
HGB BLD-MCNC: 12.7 G/DL
IMM GRANULOCYTES NFR BLD AUTO: 0.2 %
LDLC SERPL CALC-MCNC: 48 MG/DL
LYMPHOCYTES # BLD AUTO: 2.65 K/UL
LYMPHOCYTES NFR BLD AUTO: 27.8 %
MAN DIFF?: NORMAL
MCHC RBC-ENTMCNC: 29.7 PG
MCHC RBC-ENTMCNC: 31.4 GM/DL
MCV RBC AUTO: 94.6 FL
MONOCYTES # BLD AUTO: 0.52 K/UL
MONOCYTES NFR BLD AUTO: 5.5 %
NEUTROPHILS # BLD AUTO: 6.18 K/UL
NEUTROPHILS NFR BLD AUTO: 64.9 %
PLATELET # BLD AUTO: 396 K/UL
POTASSIUM SERPL-SCNC: 4.7 MMOL/L
PROT SERPL-MCNC: 6.7 G/DL
RBC # BLD: 4.27 M/UL
RBC # FLD: 13.4 %
SODIUM SERPL-SCNC: 142 MMOL/L
TRIGL SERPL-MCNC: 135 MG/DL
WBC # FLD AUTO: 9.52 K/UL

## 2018-10-17 ENCOUNTER — APPOINTMENT (OUTPATIENT)
Dept: HEART AND VASCULAR | Facility: CLINIC | Age: 83
End: 2018-10-17
Payer: MEDICARE

## 2018-10-17 VITALS — HEIGHT: 61.81 IN | BODY MASS INDEX: 32.94 KG/M2 | WEIGHT: 178.99 LBS

## 2018-10-17 PROCEDURE — A9500: CPT

## 2018-10-17 PROCEDURE — 78452 HT MUSCLE IMAGE SPECT MULT: CPT

## 2018-10-17 PROCEDURE — 93015 CV STRESS TEST SUPVJ I&R: CPT

## 2018-12-02 ENCOUNTER — RX RENEWAL (OUTPATIENT)
Age: 83
End: 2018-12-02

## 2019-01-04 ENCOUNTER — LABORATORY RESULT (OUTPATIENT)
Age: 84
End: 2019-01-04

## 2019-01-04 ENCOUNTER — APPOINTMENT (OUTPATIENT)
Dept: HEART AND VASCULAR | Facility: CLINIC | Age: 84
End: 2019-01-04
Payer: MEDICARE

## 2019-01-04 VITALS
DIASTOLIC BLOOD PRESSURE: 74 MMHG | OXYGEN SATURATION: 97 % | TEMPERATURE: 98.5 F | RESPIRATION RATE: 14 BRPM | SYSTOLIC BLOOD PRESSURE: 162 MMHG | HEART RATE: 64 BPM

## 2019-01-04 DIAGNOSIS — Z78.9 OTHER SPECIFIED HEALTH STATUS: ICD-10-CM

## 2019-01-04 PROCEDURE — 99214 OFFICE O/P EST MOD 30 MIN: CPT

## 2019-01-04 PROCEDURE — 93000 ELECTROCARDIOGRAM COMPLETE: CPT

## 2019-01-04 PROCEDURE — 36415 COLL VENOUS BLD VENIPUNCTURE: CPT

## 2019-01-04 NOTE — REASON FOR VISIT
[FreeTextEntry1] : CAD s/p SEAN to LAD 1/18/17, pt had ostial RCA and LCx dz of 50 and 60% left alone. Reports some PAYNE, tory with stairs.  Getting more PAYNE.  Nuc stress test + in lateral wall.\par \par CCS of 385 in 2014           \par \par \par \par \par EKG: NSR, normal axis and intervals, no ST-Tw abnormalities. 14//19

## 2019-01-04 NOTE — REVIEW OF SYSTEMS
[Dyspnea on exertion] : dyspnea during exertion [Lower Ext Edema] : lower extremity edema [Under Stress] : under stress [Negative] : Heme/Lymph

## 2019-01-04 NOTE — ASSESSMENT
[FreeTextEntry1] : ASHD- LAD SEAN 1/2017, will update Nuc stress as she reports PAYNE and had ostial RCA and LCx dz of 50-60%.  Nuclear stress + for lateral ischemia.  Having more Sxs on exertion.  Favor cath, pt agrees\par \par HTN- BP up today but no changes\par \par HLD- labs drawn\par \par DM- sees Podiatry and ophtho, diet poor, reports a high sugar.\par \par R/O SSC, referred to Derm\par \par RLE EDEMA- R/O Chronic DVT. US ordered

## 2019-01-04 NOTE — PHYSICAL EXAM
[General Appearance - Well Developed] : well developed [Normal Appearance] : normal appearance [Well Groomed] : well groomed [General Appearance - Well Nourished] : well nourished [No Deformities] : no deformities [General Appearance - In No Acute Distress] : no acute distress [Normal Conjunctiva] : the conjunctiva exhibited no abnormalities [Eyelids - No Xanthelasma] : the eyelids demonstrated no xanthelasmas [Normal Oral Mucosa] : normal oral mucosa [No Oral Pallor] : no oral pallor [No Oral Cyanosis] : no oral cyanosis [Respiration, Rhythm And Depth] : normal respiratory rhythm and effort [Exaggerated Use Of Accessory Muscles For Inspiration] : no accessory muscle use [Auscultation Breath Sounds / Voice Sounds] : lungs were clear to auscultation bilaterally [Heart Rate And Rhythm] : heart rate and rhythm were normal [Heart Sounds] : normal S1 and S2 [Murmurs] : no murmurs present [Abdomen Soft] : soft [Abdomen Tenderness] : non-tender [Abdomen Mass (___ Cm)] : no abdominal mass palpated [Abnormal Walk] : normal gait [Gait - Sufficient For Exercise Testing] : the gait was sufficient for exercise testing [Nail Clubbing] : no clubbing of the fingernails [Cyanosis, Localized] : no localized cyanosis [Petechial Hemorrhages (___cm)] : no petechial hemorrhages [] : no ischemic changes [Oriented To Time, Place, And Person] : oriented to person, place, and time [Affect] : the affect was normal [Mood] : the mood was normal [No Anxiety] : not feeling anxious [FreeTextEntry1] : RLE 2 lesions over shin suspicious for SSC., mild edema, + veins

## 2019-01-07 LAB
ALBUMIN SERPL ELPH-MCNC: 4.3 G/DL
ALP BLD-CCNC: 77 U/L
ALT SERPL-CCNC: 11 U/L
ANION GAP SERPL CALC-SCNC: 16 MMOL/L
APPEARANCE: CLEAR
APTT BLD: 31.2 SEC
AST SERPL-CCNC: 18 U/L
BASOPHILS # BLD AUTO: 0.03 K/UL
BASOPHILS NFR BLD AUTO: 0.3 %
BILIRUB SERPL-MCNC: 0.2 MG/DL
BILIRUBIN URINE: NEGATIVE
BLOOD URINE: NEGATIVE
BUN SERPL-MCNC: 22 MG/DL
CALCIUM SERPL-MCNC: 9.8 MG/DL
CHLORIDE SERPL-SCNC: 104 MMOL/L
CHOLEST SERPL-MCNC: 149 MG/DL
CHOLEST/HDLC SERPL: 2.4 RATIO
CO2 SERPL-SCNC: 21 MMOL/L
COLOR: YELLOW
CREAT SERPL-MCNC: 1.05 MG/DL
EOSINOPHIL # BLD AUTO: 0.24 K/UL
EOSINOPHIL NFR BLD AUTO: 2.3 %
GLUCOSE QUALITATIVE U: NEGATIVE MG/DL
GLUCOSE SERPL-MCNC: 107 MG/DL
HBA1C MFR BLD HPLC: 7.9 %
HCT VFR BLD CALC: 41.9 %
HDLC SERPL-MCNC: 61 MG/DL
HGB BLD-MCNC: 12.5 G/DL
IMM GRANULOCYTES NFR BLD AUTO: 0.3 %
INR PPP: 0.97 RATIO
KETONES URINE: NEGATIVE
LDLC SERPL CALC-MCNC: 46 MG/DL
LEUKOCYTE ESTERASE URINE: ABNORMAL
LYMPHOCYTES # BLD AUTO: 2.64 K/UL
LYMPHOCYTES NFR BLD AUTO: 25.6 %
MAN DIFF?: NORMAL
MCHC RBC-ENTMCNC: 29.1 PG
MCHC RBC-ENTMCNC: 29.8 GM/DL
MCV RBC AUTO: 97.4 FL
MONOCYTES # BLD AUTO: 0.61 K/UL
MONOCYTES NFR BLD AUTO: 5.9 %
NEUTROPHILS # BLD AUTO: 6.76 K/UL
NEUTROPHILS NFR BLD AUTO: 65.6 %
NITRITE URINE: NEGATIVE
PH URINE: 5
PLATELET # BLD AUTO: 384 K/UL
POTASSIUM SERPL-SCNC: 5.2 MMOL/L
PROT SERPL-MCNC: 6.9 G/DL
PROTEIN URINE: NEGATIVE MG/DL
PT BLD: 10.8 SEC
RBC # BLD: 4.3 M/UL
RBC # FLD: 14.2 %
SODIUM SERPL-SCNC: 141 MMOL/L
SPECIFIC GRAVITY URINE: 1.02
TRIGL SERPL-MCNC: 211 MG/DL
UROBILINOGEN URINE: NEGATIVE MG/DL
WBC # FLD AUTO: 10.31 K/UL

## 2019-01-10 VITALS
DIASTOLIC BLOOD PRESSURE: 85 MMHG | OXYGEN SATURATION: 98 % | SYSTOLIC BLOOD PRESSURE: 171 MMHG | RESPIRATION RATE: 16 BRPM | TEMPERATURE: 97 F | HEART RATE: 78 BPM

## 2019-01-10 NOTE — H&P ADULT - PMH
Breast CA  s/p lumpectomy on right  Cataracts, bilateral    DM (diabetes mellitus)    Hyperlipidemia    Hypertension    TIA (transient ischemic attack)    Uterine cancer

## 2019-01-10 NOTE — H&P ADULT - HISTORY OF PRESENT ILLNESS
***CONFIRM MEDS    85 y.o Female former smoker, strong family h/o heart disease(daughter recently passed away from MI 2yrs ago), HTN, Hyperlipidemia, NIDDM, Breast CA s/p R lumpectomy in 2013, Uterine CA s/p ZEENAT in past, TIA in 2013(no residual deficits), Known CAD s/p SEAN mLAD @ [facility] on 01/18/17 with residual 50% ostial LCx, 60% ostial RCA, LVEF of 60%, who recently presented to her Cardiologist Dr. Stanton   with progressively worsening PAYNE over the last several weeks.  Pt states she easily becomes winded and fatigued upon minimal exertion when performing her ADLs. Furthermore, she states she can barely walk one block or climb a flight of stairs without making frequent stops to catch her breath.  She denies any CP, palpitations, dizziness, syncope, recent PND/orthopnea, or LE edema.  Nuclear Stress test performed on 10/17/18 revealed moderate sized, mild reversible lateral wall defect, LVEF of 67%.      In light of patient's risk factors, Known CAD, CCS Anginal Class 3 Equivalent symptoms, and an abnormal Stress test, pt is now referred to [facility] for recommended Cardiac Catheterization with intervention if clinically indicated to r/o progressive CAD.        CATH HX:  Cardiac Cath @ [facility] on 01/18/17: LM w/ mild luminal irregularities, 90% mLAD, 50% ostial LCx, 60% ostial RCA, LVEF of 60%.  LVEDP of 60%.  LVEDP of 11mmHg.  No AS or MR.  Successful SEAN mLAD 85 y.o Female former smoker, strong family h/o heart disease(daughter recently passed away from MI 2yrs ago), HTN, Hyperlipidemia, NIDDM, Breast CA s/p R lumpectomy in 2013, Uterine CA s/p ZEENAT in past, TIA in 2013(no residual deficits), Known CAD s/p SEAN mLAD @ [facility] on 01/18/17 with residual 50% ostial LCx, 60% ostial RCA, LVEF of 60%, who recently presented to her Cardiologist Dr. Stanton   with progressively worsening PAYNE over the last several weeks.  Pt states she easily becomes winded and fatigued upon minimal exertion when performing her ADLs. Furthermore, she states she can barely walk one block or climb a flight of stairs without making frequent stops to catch her breath.  She denies any CP, palpitations, dizziness, syncope, recent PND/orthopnea, or LE edema.  Nuclear Stress test performed on 10/17/18 revealed moderate sized, mild reversible lateral wall defect, LVEF of 67%.      In light of patient's risk factors, Known CAD, CCS Anginal Class 3 Equivalent symptoms, and an abnormal Stress test, pt is now referred to [facility] for recommended Cardiac Catheterization with intervention if clinically indicated to r/o progressive CAD.        CATH HX:  Cardiac Cath @ [facility] on 01/18/17: LM w/ mild luminal irregularities, 90% mLAD, 50% ostial LCx, 60% ostial RCA, LVEF of 60%.  LVEDP of 60%.  LVEDP of 11mmHg.  No AS or MR.  Successful SEAN mLAD

## 2019-01-10 NOTE — H&P ADULT - ASSESSMENT
85 y.o Female former smoker, strong family h/o heart disease(daughter recently passed away from MI 2yrs ago), HTN, Hyperlipidemia, NIDDM, Breast CA s/p R lumpectomy in 2013, Uterine CA s/p ZEENAT in past, TIA in 2013(no residual deficits), Known CAD s/p SEAN mLAD @ [facility] on 01/18/17 with residual 50% ostial LCx, 60% ostial RCA, LVEF of 60%, who recently presented to her Cardiologist Dr. Stanton   with progressively worsening PAYNE over the last several weeks.  Pt states she easily becomes winded and fatigued upon minimal exertion when performing her ADLs. Furthermore, she states she can barely walk one block or climb a flight of stairs without making frequent stops to catch her breath.  She denies any CP, palpitations, dizziness, syncope, recent PND/orthopnea, or LE edema.  Nuclear Stress test performed on 10/17/18 revealed moderate sized, mild reversible lateral wall defect, LVEF of 67%.  In light of patient's risk factors, Known CAD, CCS Anginal Class 3 Equivalent symptoms, and an abnormal Stress test, pt is now referred to [facility] for recommended Cardiac Catheterization with intervention if clinically indicated to r/o progressive CAD.      Pt's last dose of Aspirin 81mg daily and Plavix 75mg daily was on 1/10/19. Pt given Aspirin EC 325mg and Plavix 75mg daily prior to procedure. ASA III. Mallampati III.

## 2019-01-11 ENCOUNTER — INPATIENT (INPATIENT)
Facility: HOSPITAL | Age: 84
LOS: 0 days | Discharge: HOME | DRG: 247 | End: 2019-01-12
Attending: INTERNAL MEDICINE | Admitting: INTERNAL MEDICINE
Payer: COMMERCIAL

## 2019-01-11 DIAGNOSIS — Z90.710 ACQUIRED ABSENCE OF BOTH CERVIX AND UTERUS: Chronic | ICD-10-CM

## 2019-01-11 DIAGNOSIS — Z90.11 ACQUIRED ABSENCE OF RIGHT BREAST AND NIPPLE: Chronic | ICD-10-CM

## 2019-01-11 LAB
ALBUMIN SERPL ELPH-MCNC: 4.4 G/DL — SIGNIFICANT CHANGE UP (ref 3.3–5)
ALP SERPL-CCNC: 72 U/L — SIGNIFICANT CHANGE UP (ref 40–120)
ALT FLD-CCNC: 13 U/L — SIGNIFICANT CHANGE UP (ref 10–45)
ANION GAP SERPL CALC-SCNC: 17 MMOL/L — SIGNIFICANT CHANGE UP (ref 5–17)
APTT BLD: 31.7 SEC — SIGNIFICANT CHANGE UP (ref 27.5–36.3)
AST SERPL-CCNC: 18 U/L — SIGNIFICANT CHANGE UP (ref 10–40)
BASOPHILS NFR BLD AUTO: 0.4 % — SIGNIFICANT CHANGE UP (ref 0–2)
BILIRUB SERPL-MCNC: 0.2 MG/DL — SIGNIFICANT CHANGE UP (ref 0.2–1.2)
BUN SERPL-MCNC: 25 MG/DL — HIGH (ref 7–23)
CALCIUM SERPL-MCNC: 9.6 MG/DL — SIGNIFICANT CHANGE UP (ref 8.4–10.5)
CHLORIDE SERPL-SCNC: 101 MMOL/L — SIGNIFICANT CHANGE UP (ref 96–108)
CHOLEST SERPL-MCNC: 156 MG/DL — SIGNIFICANT CHANGE UP (ref 10–199)
CK MB CFR SERPL CALC: 7.3 NG/ML — HIGH (ref 0–6.7)
CK SERPL-CCNC: 143 U/L — SIGNIFICANT CHANGE UP (ref 25–170)
CO2 SERPL-SCNC: 21 MMOL/L — LOW (ref 22–31)
CREAT SERPL-MCNC: 1.08 MG/DL — SIGNIFICANT CHANGE UP (ref 0.5–1.3)
CRP SERPL-MCNC: 0.2 MG/DL — SIGNIFICANT CHANGE UP (ref 0–0.4)
EOSINOPHIL NFR BLD AUTO: 1.6 % — SIGNIFICANT CHANGE UP (ref 0–6)
GLUCOSE BLDC GLUCOMTR-MCNC: 159 MG/DL — HIGH (ref 70–99)
GLUCOSE BLDC GLUCOMTR-MCNC: 170 MG/DL — HIGH (ref 70–99)
GLUCOSE SERPL-MCNC: 176 MG/DL — HIGH (ref 70–99)
HBA1C BLD-MCNC: 7.9 % — HIGH (ref 4–5.6)
HCT VFR BLD CALC: 41.6 % — SIGNIFICANT CHANGE UP (ref 34.5–45)
HDLC SERPL-MCNC: 65 MG/DL — SIGNIFICANT CHANGE UP
HGB BLD-MCNC: 13.4 G/DL — SIGNIFICANT CHANGE UP (ref 11.5–15.5)
INR BLD: 1.01 — SIGNIFICANT CHANGE UP (ref 0.88–1.16)
LIPID PNL WITH DIRECT LDL SERPL: 59 MG/DL — SIGNIFICANT CHANGE UP
LYMPHOCYTES # BLD AUTO: 21.1 % — SIGNIFICANT CHANGE UP (ref 13–44)
MCHC RBC-ENTMCNC: 29.2 PG — SIGNIFICANT CHANGE UP (ref 27–34)
MCHC RBC-ENTMCNC: 32.2 G/DL — SIGNIFICANT CHANGE UP (ref 32–36)
MCV RBC AUTO: 90.6 FL — SIGNIFICANT CHANGE UP (ref 80–100)
MONOCYTES NFR BLD AUTO: 6 % — SIGNIFICANT CHANGE UP (ref 2–14)
NEUTROPHILS NFR BLD AUTO: 70.9 % — SIGNIFICANT CHANGE UP (ref 43–77)
PLATELET # BLD AUTO: 361 K/UL — SIGNIFICANT CHANGE UP (ref 150–400)
POTASSIUM SERPL-MCNC: 5.1 MMOL/L — SIGNIFICANT CHANGE UP (ref 3.5–5.3)
POTASSIUM SERPL-SCNC: 5.1 MMOL/L — SIGNIFICANT CHANGE UP (ref 3.5–5.3)
PROT SERPL-MCNC: 7.9 G/DL — SIGNIFICANT CHANGE UP (ref 6–8.3)
PROTHROM AB SERPL-ACNC: 11.4 SEC — SIGNIFICANT CHANGE UP (ref 10–12.9)
RBC # BLD: 4.59 M/UL — SIGNIFICANT CHANGE UP (ref 3.8–5.2)
RBC # FLD: 13 % — SIGNIFICANT CHANGE UP (ref 10.3–16.9)
SODIUM SERPL-SCNC: 139 MMOL/L — SIGNIFICANT CHANGE UP (ref 135–145)
TOTAL CHOLESTEROL/HDL RATIO MEASUREMENT: 2.4 RATIO — LOW (ref 3.3–7.1)
TRIGL SERPL-MCNC: 162 MG/DL — HIGH (ref 10–149)
WBC # BLD: 10.5 K/UL — SIGNIFICANT CHANGE UP (ref 3.8–10.5)
WBC # FLD AUTO: 10.5 K/UL — SIGNIFICANT CHANGE UP (ref 3.8–10.5)

## 2019-01-11 PROCEDURE — 92928 PRQ TCAT PLMT NTRAC ST 1 LES: CPT | Mod: RC

## 2019-01-11 PROCEDURE — 99234 HOSP IP/OBS SM DT SF/LOW 45: CPT

## 2019-01-11 PROCEDURE — 99222 1ST HOSP IP/OBS MODERATE 55: CPT

## 2019-01-11 PROCEDURE — 93458 L HRT ARTERY/VENTRICLE ANGIO: CPT | Mod: 26,XU

## 2019-01-11 RX ORDER — ATORVASTATIN CALCIUM 80 MG/1
40 TABLET, FILM COATED ORAL AT BEDTIME
Qty: 0 | Refills: 0 | Status: DISCONTINUED | OUTPATIENT
Start: 2019-01-11 | End: 2019-01-12

## 2019-01-11 RX ORDER — DEXTROSE 50 % IN WATER 50 %
12.5 SYRINGE (ML) INTRAVENOUS ONCE
Qty: 0 | Refills: 0 | Status: DISCONTINUED | OUTPATIENT
Start: 2019-01-11 | End: 2019-01-12

## 2019-01-11 RX ORDER — DEXTROSE 50 % IN WATER 50 %
25 SYRINGE (ML) INTRAVENOUS ONCE
Qty: 0 | Refills: 0 | Status: DISCONTINUED | OUTPATIENT
Start: 2019-01-11 | End: 2019-01-12

## 2019-01-11 RX ORDER — INSULIN LISPRO 100/ML
VIAL (ML) SUBCUTANEOUS
Qty: 0 | Refills: 0 | Status: DISCONTINUED | OUTPATIENT
Start: 2019-01-11 | End: 2019-01-12

## 2019-01-11 RX ORDER — SODIUM CHLORIDE 9 MG/ML
500 INJECTION INTRAMUSCULAR; INTRAVENOUS; SUBCUTANEOUS
Qty: 0 | Refills: 0 | Status: DISCONTINUED | OUTPATIENT
Start: 2019-01-11 | End: 2019-01-12

## 2019-01-11 RX ORDER — ASPIRIN/CALCIUM CARB/MAGNESIUM 324 MG
81 TABLET ORAL DAILY
Qty: 0 | Refills: 0 | Status: DISCONTINUED | OUTPATIENT
Start: 2019-01-12 | End: 2019-01-12

## 2019-01-11 RX ORDER — CHLORHEXIDINE GLUCONATE 213 G/1000ML
1 SOLUTION TOPICAL ONCE
Qty: 0 | Refills: 0 | Status: DISCONTINUED | OUTPATIENT
Start: 2019-01-11 | End: 2019-01-11

## 2019-01-11 RX ORDER — SODIUM CHLORIDE 9 MG/ML
500 INJECTION INTRAMUSCULAR; INTRAVENOUS; SUBCUTANEOUS
Qty: 0 | Refills: 0 | Status: DISCONTINUED | OUTPATIENT
Start: 2019-01-11 | End: 2019-01-11

## 2019-01-11 RX ORDER — ASPIRIN/CALCIUM CARB/MAGNESIUM 324 MG
325 TABLET ORAL ONCE
Qty: 0 | Refills: 0 | Status: COMPLETED | OUTPATIENT
Start: 2019-01-11 | End: 2019-01-11

## 2019-01-11 RX ORDER — CLOPIDOGREL BISULFATE 75 MG/1
75 TABLET, FILM COATED ORAL DAILY
Qty: 0 | Refills: 0 | Status: DISCONTINUED | OUTPATIENT
Start: 2019-01-12 | End: 2019-01-12

## 2019-01-11 RX ORDER — GLUCAGON INJECTION, SOLUTION 0.5 MG/.1ML
1 INJECTION, SOLUTION SUBCUTANEOUS ONCE
Qty: 0 | Refills: 0 | Status: DISCONTINUED | OUTPATIENT
Start: 2019-01-11 | End: 2019-01-12

## 2019-01-11 RX ORDER — DEXTROSE 50 % IN WATER 50 %
15 SYRINGE (ML) INTRAVENOUS ONCE
Qty: 0 | Refills: 0 | Status: DISCONTINUED | OUTPATIENT
Start: 2019-01-11 | End: 2019-01-12

## 2019-01-11 RX ORDER — AMLODIPINE BESYLATE 2.5 MG/1
5 TABLET ORAL ONCE
Qty: 0 | Refills: 0 | Status: COMPLETED | OUTPATIENT
Start: 2019-01-11 | End: 2019-01-11

## 2019-01-11 RX ORDER — SODIUM CHLORIDE 9 MG/ML
1000 INJECTION, SOLUTION INTRAVENOUS
Qty: 0 | Refills: 0 | Status: DISCONTINUED | OUTPATIENT
Start: 2019-01-11 | End: 2019-01-12

## 2019-01-11 RX ORDER — CLOPIDOGREL BISULFATE 75 MG/1
75 TABLET, FILM COATED ORAL ONCE
Qty: 0 | Refills: 0 | Status: COMPLETED | OUTPATIENT
Start: 2019-01-11 | End: 2019-01-11

## 2019-01-11 RX ORDER — SODIUM CHLORIDE 9 MG/ML
250 INJECTION INTRAMUSCULAR; INTRAVENOUS; SUBCUTANEOUS ONCE
Qty: 0 | Refills: 0 | Status: COMPLETED | OUTPATIENT
Start: 2019-01-11 | End: 2019-01-11

## 2019-01-11 RX ADMIN — CLOPIDOGREL BISULFATE 75 MILLIGRAM(S): 75 TABLET, FILM COATED ORAL at 09:28

## 2019-01-11 RX ADMIN — AMLODIPINE BESYLATE 5 MILLIGRAM(S): 2.5 TABLET ORAL at 20:57

## 2019-01-11 RX ADMIN — Medication 325 MILLIGRAM(S): at 09:28

## 2019-01-11 RX ADMIN — SODIUM CHLORIDE 500 MILLILITER(S): 9 INJECTION INTRAMUSCULAR; INTRAVENOUS; SUBCUTANEOUS at 12:37

## 2019-01-11 RX ADMIN — SODIUM CHLORIDE 75 MILLILITER(S): 9 INJECTION INTRAMUSCULAR; INTRAVENOUS; SUBCUTANEOUS at 09:29

## 2019-01-11 RX ADMIN — ATORVASTATIN CALCIUM 40 MILLIGRAM(S): 80 TABLET, FILM COATED ORAL at 20:58

## 2019-01-11 RX ADMIN — SODIUM CHLORIDE 75 MILLILITER(S): 9 INJECTION INTRAMUSCULAR; INTRAVENOUS; SUBCUTANEOUS at 12:37

## 2019-01-11 NOTE — PROGRESS NOTE ADULT - SUBJECTIVE AND OBJECTIVE BOX
Procedure: LHC, SEAN of RCA, Angioseal  Indication: UA, CAD, +EST  Complication: none    Result:  1) Two vessel CAD (50% ostial LCX, 80% ostial RCA)  2) Patent stent in the LAD  3) LVEDP 11  4) Successful SEAN of RCA with 3.0x15mm Xience stent        Plan: Admit for IV hydration given GFR<60 (49). Plavix + ASA. To f/u with Dr. Stanton.

## 2019-01-11 NOTE — CHART NOTE - NSCHARTNOTEFT_GEN_A_CORE
Patient noted to be hypotensive post procedure BP 95/43. Patient asymptomatic. Hypotension likely secondary to sedation during procedure as patient received Fentanyl 25 mcg IV and Versed 4 mg IV . Pre procedure patient hypertensive /85. EF 67% by stress test 10/17/18.  cc bolus IV x 1 ordered. Continue to monitor. Patient noted to be hypotensive post procedure BP 95/43. Patient asymptomatic. Hypotension likely secondary to sedation during procedure as patient received Fentanyl 25 mcg IV and Versed 4 mg IV . Pre procedure patient hypertensive /85. EF 67% by stress test 10/17/18. Post procedure /62 at 11 AM with subsequent decline to 95/43.   cc bolus x 1 ordered. After bolus given BP improved to 109/49. Continue to monitor. 5 Uris PAs made aware. Right groin soft non-tender. No hematoma or bleed. Patient denies back pain or abdominal pain.

## 2019-01-11 NOTE — CONSULT NOTE ADULT - SUBJECTIVE AND OBJECTIVE BOX
Preventive Cardiology Consultation Note    Cardiologist - Dr. Ervin Stanton     CHIEF COMPLAINT: s/p cardiac catheterization requiring cardiovascular prevention optimization and education    HISTORY OF PRESENT ILLNESS: 85 y.o Female former smoker,  HTN, Hyperlipidemia, NIDDM, Breast CA s/p R lumpectomy in 2013, Uterine CA s/p ZEENAT in past, TIA in 2013 (no residual deficits), CKD stage III-GFR 47. Cr 1.08 . Known CAD s/p SEAN mLAD @ [facility] on 01/18/17 with residual 50% ostial LCx, 60% ostial RCA, LVEF of 60%. Patient is now s/p cardiac cath 1/11/19: 2V CAD. SEAN o/pRCA (80%). Ostial LCx 50%. Patent prior stent LAD.     Review of systems otherwise negative.     Lifestyle History:  Mediterranean Diet Score (9 question survey) was 2.   (8-9: optimal, 6-7: near-optimal, 4-5: suboptimal, 0-3: markedly suboptimal)  Exercise: Patient reports very little physical activity. She denies any regular exercise other than minimal walking necessary for daily activities. She has been putting off hip replacement for years in favor of steroid injections, and would likely benefit from physical therapy in the meantime.   Smoking: Former smoker (2 PPD x 20 years; quit 40 years ago).  Stress: Patient lost one of her daughters a few years ago and reports some lingering difficulty coping with her passing.     PAST MEDICAL & SURGICAL HISTORY:  TIA (transient ischemic attack)  Uterine cancer  Hyperlipidemia  Hypertension  Cataracts, bilateral  Breast CA: s/p lumpectomy on right  DM (diabetes mellitus)  History of lumpectomy of right breast  S/P ZEENAT-BSO  CKD Stage III    FAMILY HISTORY:   Patient denies any first degree family history of premature CAD or CVA.     Allergies:   No Known Allergies      HOME MEDICATIONS:   Januvia 100 mg oral tablet: 1 tab(s) orally once a day (17 Jan 2017 11:43)  metFORMIN 1000 mg oral tablet, extended release: 1 tab(s) orally once a day.  Atorvastatin 40mg QD     Hold Metformin for 48 hours and restart on 1/21/17 (19 Jan 2017 10:47)      PHYSICAL EXAM:  T(F): 97.4  HR: 78  BP: 171/85  RR: 16/min  SpO2: 98%  	  Gen- awake, conversive  Head-NCAT; eyes: no corneal arcus noted b/l, no xanthelasmas   Neck- no thyromegaly, no cervical LAD, no JVD, no carotid bruit b/l  Respiratory- good air entry b/l, no WRR  Cardiovascular- S1S2, RRR, no MRG appr, no LE edema, distal pulses 2+ b/l  Gastrointestinal- no HSM, no masses  Neurology- moves all extremities, no focal deficits  Psych- normal affect, non-depressed mood  Skin- no lesions, no rashes, no xanthomas     LABS:	                      13.4   10.5  )-----------( 361      ( 11 Jan 2019 08:20 )             41.6     01-11    139  |  101  |  25<H>  ----------------------------<  176<H>  5.1   |  21<L>  |  1.08    Ca    9.6      11 Jan 2019 08:20    TPro  7.9  /  Alb  4.4  /  TBili  0.2  /  DBili  x   /  AST  18  /  ALT  13  /  AlkPhos  72  01-11      Hemoglobin A1C, Whole Blood: 7.9 % (01-11 @ 08:20)      Cholesterol, Serum: 156 mg/dL (01-11 @ 08:20)  HDL Cholesterol, Serum: 65 mg/dL (01-11 @ 08:20)  Triglycerides, Serum: 162 mg/dL (01-11 @ 08:20)  Direct LDL: 59 mg/dL (01-11 @ 08:20)    C-Reactive Protein, Serum: 0.20 mg/dL (01-11 @ 08:20)      ASSESSMENT/RECOMMENDATIONS: 	  Patient's dietary, exercise and overall lifestyle habits were reviewed. The concept of atherosclerosis and its systemic nature was discussed with a focus on the need to get all cardiovascular risk factors to goal. At this time, I would like to make the following recommendations to optimize atherosclerotic risk factors.     RECOMMENDATIONS:   Anti-platelet Therapy: DAPT per interventionalist recommendation.   Lipid Therapy: Patient is currently taking atorvastatin 40mg daily and we feel this is adequate for her at this time.   Hypertension: Blood pressures during this stay were variable. She reports her BP is generally well-controlled at home. If patient continues to have uncontrolled BP in the outpatient setting, the addtion of an anti-hypertensive agent could be considered, such as a CCB and/or diuretic.    Mediterranean Diet Score is 2. Some suggestions include incorporating 2 or more servings per day of vegetables, fruits, and whole grains. Increase intake of fish and legumes/beans to 2 or more servings per week. Aim to increase intake of healthy fats, such as olive oil and avocados, and have a handful of nuts/seeds most days. Reduce red/processed meat consumption to 2 or fewer times per week.   Exercise: Recommended gradually increasing activity to 30-45 minutes most days of the week once cleared by referring cardiologist. Cardiac rehab might benefit this patient and covered by major insurance plans (other than co-pays), please refer.   Medication Adherence: Patient has no issues with adherence at this time.   Smoking: This patient is a non-smoker.   Obesity/Overweight: The patient was advised about specific mechanisms such as reduced portions and increased activity for efforts toward weight loss.   Glucometabolic State: Patient's blood sugar is not well-controlled at this time. Recent HgA1C was 7.9%. Patient reports she recently increased her metformin dosage to 1500mg daily. Depending on discussions with patient's PCP and/or endocrinologist, we would recommend the possibility of switching to and/or the addition of a GLP-1 agonist or SGLT-2 inhibitor, as these newer agents have cardiovascular benefits as well as glucose control.   Sleep Apnea: The patient is at low risk for sleep apnea.   Psychological Stress: The patient appears to be coping with stressors well at this time.     Thank you for the opportunity to see this patient. Please feel free to contact Prevention if there are any questions, or if you feel that your patient would benefit from continued follow-up visits with the Program.      Lizy Carbajal MD  System Director, Cardiovascular Prevention

## 2019-01-12 ENCOUNTER — TRANSCRIPTION ENCOUNTER (OUTPATIENT)
Age: 84
End: 2019-01-12

## 2019-01-12 VITALS — TEMPERATURE: 97 F

## 2019-01-12 LAB
ANION GAP SERPL CALC-SCNC: 11 MMOL/L — SIGNIFICANT CHANGE UP (ref 5–17)
BASOPHILS NFR BLD AUTO: 0.2 % — SIGNIFICANT CHANGE UP (ref 0–2)
BUN SERPL-MCNC: 22 MG/DL — SIGNIFICANT CHANGE UP (ref 7–23)
CALCIUM SERPL-MCNC: 9.2 MG/DL — SIGNIFICANT CHANGE UP (ref 8.4–10.5)
CHLORIDE SERPL-SCNC: 105 MMOL/L — SIGNIFICANT CHANGE UP (ref 96–108)
CO2 SERPL-SCNC: 23 MMOL/L — SIGNIFICANT CHANGE UP (ref 22–31)
CREAT SERPL-MCNC: 1.05 MG/DL — SIGNIFICANT CHANGE UP (ref 0.5–1.3)
EOSINOPHIL NFR BLD AUTO: 1.9 % — SIGNIFICANT CHANGE UP (ref 0–6)
GLUCOSE BLDC GLUCOMTR-MCNC: 171 MG/DL — HIGH (ref 70–99)
GLUCOSE BLDC GLUCOMTR-MCNC: 208 MG/DL — HIGH (ref 70–99)
GLUCOSE SERPL-MCNC: 168 MG/DL — HIGH (ref 70–99)
HCT VFR BLD CALC: 36.1 % — SIGNIFICANT CHANGE UP (ref 34.5–45)
HGB BLD-MCNC: 11.8 G/DL — SIGNIFICANT CHANGE UP (ref 11.5–15.5)
LYMPHOCYTES # BLD AUTO: 26.1 % — SIGNIFICANT CHANGE UP (ref 13–44)
MAGNESIUM SERPL-MCNC: 1.6 MG/DL — SIGNIFICANT CHANGE UP (ref 1.6–2.6)
MCHC RBC-ENTMCNC: 29 PG — SIGNIFICANT CHANGE UP (ref 27–34)
MCHC RBC-ENTMCNC: 32.7 G/DL — SIGNIFICANT CHANGE UP (ref 32–36)
MCV RBC AUTO: 88.7 FL — SIGNIFICANT CHANGE UP (ref 80–100)
MONOCYTES NFR BLD AUTO: 5.6 % — SIGNIFICANT CHANGE UP (ref 2–14)
NEUTROPHILS NFR BLD AUTO: 66.2 % — SIGNIFICANT CHANGE UP (ref 43–77)
PLATELET # BLD AUTO: 328 K/UL — SIGNIFICANT CHANGE UP (ref 150–400)
POTASSIUM SERPL-MCNC: 4.7 MMOL/L — SIGNIFICANT CHANGE UP (ref 3.5–5.3)
POTASSIUM SERPL-SCNC: 4.7 MMOL/L — SIGNIFICANT CHANGE UP (ref 3.5–5.3)
RBC # BLD: 4.07 M/UL — SIGNIFICANT CHANGE UP (ref 3.8–5.2)
RBC # FLD: 13 % — SIGNIFICANT CHANGE UP (ref 10.3–16.9)
SODIUM SERPL-SCNC: 139 MMOL/L — SIGNIFICANT CHANGE UP (ref 135–145)
WBC # BLD: 8.2 K/UL — SIGNIFICANT CHANGE UP (ref 3.8–10.5)
WBC # FLD AUTO: 8.2 K/UL — SIGNIFICANT CHANGE UP (ref 3.8–10.5)

## 2019-01-12 PROCEDURE — 99239 HOSP IP/OBS DSCHRG MGMT >30: CPT

## 2019-01-12 RX ORDER — MAGNESIUM SULFATE 500 MG/ML
2 VIAL (ML) INJECTION ONCE
Qty: 0 | Refills: 0 | Status: COMPLETED | OUTPATIENT
Start: 2019-01-12 | End: 2019-01-12

## 2019-01-12 RX ORDER — METFORMIN HYDROCHLORIDE 850 MG/1
1 TABLET ORAL
Qty: 0 | Refills: 0 | COMMUNITY

## 2019-01-12 RX ADMIN — Medication 50 GRAM(S): at 08:02

## 2019-01-12 RX ADMIN — CLOPIDOGREL BISULFATE 75 MILLIGRAM(S): 75 TABLET, FILM COATED ORAL at 10:34

## 2019-01-12 RX ADMIN — Medication 81 MILLIGRAM(S): at 10:34

## 2019-01-12 RX ADMIN — Medication 4: at 12:07

## 2019-01-12 NOTE — DISCHARGE NOTE ADULT - MEDICATION SUMMARY - MEDICATIONS TO TAKE
I will START or STAY ON the medications listed below when I get home from the hospital:    aspirin 81 mg oral delayed release tablet  -- 1 tab(s) by mouth once a day  -- Indication: For Coronary artery disease/KEEPING YOUR STENT OPEN    Januvia 100 mg oral tablet  -- 1 tab(s) by mouth once a day  -- Indication: For Diabetes    metFORMIN 1000 mg oral tablet, extended release  -- 1 tab(s) by mouth once a day.    Hold Metformin for 48 hours and restart on 1/14/19  -- Indication: For Diabetes DO NOT TAKE UNTIL MONDAY 1/14/19    atorvastatin 40 mg oral tablet  -- 1 tab(s) by mouth once a day (at bedtime)  -- Avoid grapefruit and grapefruit juice while taking this medication.  Do not take this drug if you are pregnant.  It is very important that you take or use this exactly as directed.  Do not skip doses or discontinue unless directed by your doctor.  Obtain medical advice before taking any non-prescription drugs as some may affect the action of this medication.  Take with food or milk.    -- Indication: For Cholesterol control    clopidogrel 75 mg oral tablet  -- 1 tab(s) by mouth once a day  -- Indication: For Coronary artery disease/KEEPING YOUR STENT OPEN

## 2019-01-12 NOTE — DISCHARGE NOTE ADULT - HOSPITAL COURSE
85 y.o Female former smoker, strong family h/o heart disease(daughter recently passed away from MI 2yrs ago), HTN, Hyperlipidemia, NIDDM, Breast CA s/p R lumpectomy in 2013, Uterine CA s/p ZEENAT in past, TIA in 2013(no residual deficits), Known CAD s/p SEAN mLAD @ [facility] on 01/18/17 with residual 50% ostial LCx, 60% ostial RCA, LVEF of 60%, who recently presented to her Cardiologist Dr. Stanton   with progressively worsening PAYNE over the last several weeks.  Pt states she easily becomes winded and fatigued upon minimal exertion when performing her ADLs. Furthermore, she states she can barely walk one block or climb a flight of stairs without making frequent stops to catch her breath.  She denies any CP, palpitations, dizziness, syncope, recent PND/orthopnea, or LE edema.  Nuclear Stress test performed on 10/17/18 revealed moderate sized, mild reversible lateral wall defect, LVEF of 67%.  Pt now s/p cardiac cath 1/11/19: 2V CAD. SEAN o/pRCA (80%). Ostial LCx 50%. Patent prior stent LAD. EF 67% by stress test 10/17/18 . Right groin Angioseal. CKD stage III-GFR 47. Cr 1.08 . 85 cc contrast used. IVF x 6 hrs ordered. Patient not currently on Nephrotoxic agents.  Pre-procedure 171/85 . Received Fentanyl 25 and Versed 4 during procedure. Post procedure noted to be hypotensive BP 95/43 likely secondary to sedation. Patient asymptomatic. Post procedure /62 at 11 AM  cc bolus x 1 ordered. BP improved to 109/49 after bolus given.  Continue to monitor. 85 y.o Female former smoker, strong family h/o heart disease(daughter recently passed away from MI 2yrs ago), HTN, Hyperlipidemia, NIDDM, Breast CA s/p R lumpectomy in 2013, Uterine CA s/p ZEENAT in past, TIA in 2013(no residual deficits), Known CAD s/p SEAN mLAD @ [facility] on 01/18/17 with residual 50% ostial LCx, 60% ostial RCA, LVEF of 60%, who recently presented to her Cardiologist Dr. Stanton   with progressively worsening PAYNE over the last several weeks.  Pt states she easily becomes winded and fatigued upon minimal exertion when performing her ADLs. Furthermore, she states she can barely walk one block or climb a flight of stairs without making frequent stops to catch her breath.  She denies any CP, palpitations, dizziness, syncope, recent PND/orthopnea, or LE edema.  Nuclear Stress test performed on 10/17/18 revealed moderate sized, mild reversible lateral wall defect, LVEF of 67%.  Pt now s/p cardiac cath 1/11/19: 2V CAD. SEAN o/pRCA (80%). Ostial LCx 50%. Patent prior stent LAD. EF 67% by stress test 10/17/18 . Right groin Angioseal. CKD stage III-GFR 47. Cr 1.08 . 85 cc contrast used. IVF x 6 hrs ordered. Patient not currently on Nephrotoxic agents.  Pre-procedure 171/85 . Received Fentanyl 25 and Versed 4 during procedure. Post procedure noted to be hypotensive BP 95/43 likely secondary to sedation. Patient asymptomatic. Post procedure /62 at 11 AM  cc bolus x 1 ordered. BP improved to 109/49 after bolus given. Pt was asymptomatic during hospitalization. Pt's SBP increased to 190s on 1/12/19 5AM and Norvasc 5mg x 1 given and pt's SBP decreased to 150s. Pt was not discharged on antihypertensives as her BP was labile. Pt will follow up with Dr Stanton and discuss starting an antihypertensive as an outpt.

## 2019-01-12 NOTE — DISCHARGE NOTE ADULT - CARE PLAN
Principal Discharge DX:	Unstable angina  Goal:	You have blockages in the blood vessels that give blood and oxygen to your heart. This is called CORONARY ARTERY DISEASE. You had a procedure called a CARDIAC CATHETERIZATION and received a drug eluting stent to your ostial/proximal RIGHT CORONARY ARTERY. It is VERY IMPORTANT that you take ASPIRIN 81mg daily and PLAVIX (CLOPIDOGREL) 75mg daily EVERY SINGLE DAY to avoid blood clots forming in your stent.  Assessment and plan of treatment:	Right groin wound care: do not lift objects heavier than 5 lbs for 5 days. You may shower today. Do not soak in water for 5 days. Observe for signs of bleeding including bleeding/sudden swelling to your right groin site, new/worsening back pain, or numbness/tingling/pain/coolness to your right leg/foot/toes. If you experience these symptoms call your doctor and go to the nearest ED immediately.  Secondary Diagnosis:	DM (diabetes mellitus)  Goal:	DO NOT take METFORMIN  Secondary Diagnosis:	Hypertension  Secondary Diagnosis:	Hyperlipidemia Principal Discharge DX:	Unstable angina  Goal:	You have blockages in the blood vessels that give blood and oxygen to your heart. This is called CORONARY ARTERY DISEASE. You had a procedure called a CARDIAC CATHETERIZATION and received a drug eluting stent to your ostial/proximal RIGHT CORONARY ARTERY. It is VERY IMPORTANT that you take ASPIRIN 81mg daily and PLAVIX (CLOPIDOGREL) 75mg daily EVERY SINGLE DAY to avoid blood clots forming in your stent.  Assessment and plan of treatment:	Right groin wound care: do not lift objects heavier than 5 lbs for 5 days. You may shower today. Do not soak in water for 5 days. Observe for signs of bleeding including bleeding/sudden swelling to your right groin site, new/worsening back pain, or numbness/tingling/pain/coolness to your right leg/foot/toes. If you experience these symptoms call your doctor and go to the nearest ED immediately. Follow up your cardiologist Dr Stanton in 1-2 weeks.  Secondary Diagnosis:	DM (diabetes mellitus)  Goal:	DO NOT take METFORMIN 1000mg daily until MONDAY 1/14/19. Continue taking other diabetes medications today.  Secondary Diagnosis:	Hypertension  Goal:	You are not taking any medications for blood pressure. Your blood pressure was intermittently elevated on this admission but it was low after you received a lot of anesthesia. It is important that you follow up with your cardiologist to see if you should be started on a medication for better blood pressure control.  Secondary Diagnosis:	Hyperlipidemia  Goal:	Continue taking Atorvastatin 40mg daily for cholesterol control.

## 2019-01-12 NOTE — DISCHARGE NOTE ADULT - CARE PROVIDER_API CALL
Ervin Stanton), Cardiovascular Disease; Internal Medicine  Cardiology [place] [address] [place] [address]  Phone: [phone]  Fax: [phone]

## 2019-01-12 NOTE — DISCHARGE NOTE ADULT - PLAN OF CARE
You have blockages in the blood vessels that give blood and oxygen to your heart. This is called CORONARY ARTERY DISEASE. You had a procedure called a CARDIAC CATHETERIZATION and received a drug eluting stent to your ostial/proximal RIGHT CORONARY ARTERY. It is VERY IMPORTANT that you take ASPIRIN 81mg daily and PLAVIX (CLOPIDOGREL) 75mg daily EVERY SINGLE DAY to avoid blood clots forming in your stent. Right groin wound care: do not lift objects heavier than 5 lbs for 5 days. You may shower today. Do not soak in water for 5 days. Observe for signs of bleeding including bleeding/sudden swelling to your right groin site, new/worsening back pain, or numbness/tingling/pain/coolness to your right leg/foot/toes. If you experience these symptoms call your doctor and go to the nearest ED immediately. DO NOT take METFORMIN Right groin wound care: do not lift objects heavier than 5 lbs for 5 days. You may shower today. Do not soak in water for 5 days. Observe for signs of bleeding including bleeding/sudden swelling to your right groin site, new/worsening back pain, or numbness/tingling/pain/coolness to your right leg/foot/toes. If you experience these symptoms call your doctor and go to the nearest ED immediately. Follow up your cardiologist Dr Stanton in 1-2 weeks. DO NOT take METFORMIN 1000mg daily until MONDAY 1/14/19. Continue taking other diabetes medications today. You are not taking any medications for blood pressure. Your blood pressure was intermittently elevated on this admission but it was low after you received a lot of anesthesia. It is important that you follow up with your cardiologist to see if you should be started on a medication for better blood pressure control. Continue taking Atorvastatin 40mg daily for cholesterol control.

## 2019-01-12 NOTE — PROGRESS NOTE ADULT - SUBJECTIVE AND OBJECTIVE BOX
INTERVENTIONAL CARDIOLOGY FOLLOW UP NOTE    -Patient seen and examined this am    -No events overnight    -No complaints this am    VASCULAR ACCESS EXAM:    FEMORAL:    2+ right/left femoral pulse    2+ DP/PT pulses.    -Right femoral Access site clean, non-tender, without ecchymosis or hematoma.    A/P: 85 y.o Female former smoker, strong family h/o heart disease(daughter recently passed away from MI 2yrs ago), HTN, Hyperlipidemia, NIDDM, Breast CA s/p R lumpectomy in 2013, Uterine CA s/p ZEENAT in past, TIA in 2013(no residual deficits), Known CAD s/p SEAN mLAD @ [facility] on 01/18/17 with residual 50% ostial LCx, 60% ostial RCA, LVEF of 60% now s/p PCI of ostial RCA with 3.0x15mm Xience stent via right femoral approach with no evidence of vascular complications post procedure.    -continue with current medications including dual antiplatelet therapy    -discharge instructions as per protocol    -outpatient follow up with primary cardiologist

## 2019-01-12 NOTE — DISCHARGE NOTE ADULT - PATIENT PORTAL LINK FT
You can access the [facility] Patient Portal, offered by [facility], by registering with the following website: [email or web address]

## 2019-01-22 DIAGNOSIS — Z85.3 PERSONAL HISTORY OF MALIGNANT NEOPLASM OF BREAST: ICD-10-CM

## 2019-01-22 DIAGNOSIS — I95.2 HYPOTENSION DUE TO DRUGS: ICD-10-CM

## 2019-01-22 DIAGNOSIS — Z87.891 PERSONAL HISTORY OF NICOTINE DEPENDENCE: ICD-10-CM

## 2019-01-22 DIAGNOSIS — N18.3 CHRONIC KIDNEY DISEASE, STAGE 3 (MODERATE): ICD-10-CM

## 2019-01-22 DIAGNOSIS — Z00.6 ENCOUNTER FOR EXAMINATION FOR NORMAL COMPARISON AND CONTROL IN CLINICAL RESEARCH PROGRAM: ICD-10-CM

## 2019-01-22 DIAGNOSIS — I25.110 ATHEROSCLEROTIC HEART DISEASE OF NATIVE CORONARY ARTERY WITH UNSTABLE ANGINA PECTORIS: ICD-10-CM

## 2019-01-22 DIAGNOSIS — Z79.84 LONG TERM (CURRENT) USE OF ORAL HYPOGLYCEMIC DRUGS: ICD-10-CM

## 2019-01-22 DIAGNOSIS — E11.22 TYPE 2 DIABETES MELLITUS WITH DIABETIC CHRONIC KIDNEY DISEASE: ICD-10-CM

## 2019-01-22 DIAGNOSIS — Z85.42 PERSONAL HISTORY OF MALIGNANT NEOPLASM OF OTHER PARTS OF UTERUS: ICD-10-CM

## 2019-01-22 DIAGNOSIS — E78.5 HYPERLIPIDEMIA, UNSPECIFIED: ICD-10-CM

## 2019-01-22 DIAGNOSIS — Z95.5 PRESENCE OF CORONARY ANGIOPLASTY IMPLANT AND GRAFT: ICD-10-CM

## 2019-01-22 DIAGNOSIS — I12.9 HYPERTENSIVE CHRONIC KIDNEY DISEASE WITH STAGE 1 THROUGH STAGE 4 CHRONIC KIDNEY DISEASE, OR UNSPECIFIED CHRONIC KIDNEY DISEASE: ICD-10-CM

## 2019-01-22 DIAGNOSIS — Z86.73 PERSONAL HISTORY OF TRANSIENT ISCHEMIC ATTACK (TIA), AND CEREBRAL INFARCTION WITHOUT RESIDUAL DEFICITS: ICD-10-CM

## 2019-01-29 PROCEDURE — C1887: CPT

## 2019-01-29 PROCEDURE — 82550 ASSAY OF CK (CPK): CPT

## 2019-01-29 PROCEDURE — 83735 ASSAY OF MAGNESIUM: CPT

## 2019-01-29 PROCEDURE — C1760: CPT

## 2019-01-29 PROCEDURE — C1894: CPT

## 2019-01-29 PROCEDURE — 85025 COMPLETE CBC W/AUTO DIFF WBC: CPT

## 2019-01-29 PROCEDURE — 85730 THROMBOPLASTIN TIME PARTIAL: CPT

## 2019-01-29 PROCEDURE — C1874: CPT

## 2019-01-29 PROCEDURE — 82962 GLUCOSE BLOOD TEST: CPT

## 2019-01-29 PROCEDURE — 86140 C-REACTIVE PROTEIN: CPT

## 2019-01-29 PROCEDURE — 80061 LIPID PANEL: CPT

## 2019-01-29 PROCEDURE — 82553 CREATINE MB FRACTION: CPT

## 2019-01-29 PROCEDURE — 93458 L HRT ARTERY/VENTRICLE ANGIO: CPT

## 2019-01-29 PROCEDURE — 80053 COMPREHEN METABOLIC PANEL: CPT

## 2019-01-29 PROCEDURE — 83036 HEMOGLOBIN GLYCOSYLATED A1C: CPT

## 2019-01-29 PROCEDURE — 80048 BASIC METABOLIC PNL TOTAL CA: CPT

## 2019-01-29 PROCEDURE — C1769: CPT

## 2019-01-29 PROCEDURE — C1889: CPT

## 2019-01-29 PROCEDURE — 36415 COLL VENOUS BLD VENIPUNCTURE: CPT

## 2019-01-29 PROCEDURE — C9600: CPT

## 2019-01-29 PROCEDURE — 85610 PROTHROMBIN TIME: CPT

## 2019-05-03 ENCOUNTER — RX CHANGE (OUTPATIENT)
Age: 84
End: 2019-05-03

## 2019-05-03 DIAGNOSIS — K21.9 GASTRO-ESOPHAGEAL REFLUX DISEASE W/OUT ESOPHAGITIS: ICD-10-CM

## 2019-05-03 RX ORDER — PANTOPRAZOLE 40 MG/1
40 TABLET, DELAYED RELEASE ORAL
Qty: 30 | Refills: 4 | Status: ACTIVE | COMMUNITY
Start: 2019-05-03 | End: 1900-01-01

## 2019-05-06 PROBLEM — I10 ESSENTIAL (PRIMARY) HYPERTENSION: Chronic | Status: ACTIVE | Noted: 2019-01-10

## 2019-05-06 PROBLEM — C55 MALIGNANT NEOPLASM OF UTERUS, PART UNSPECIFIED: Chronic | Status: ACTIVE | Noted: 2019-01-10

## 2019-05-06 PROBLEM — E78.5 HYPERLIPIDEMIA, UNSPECIFIED: Chronic | Status: ACTIVE | Noted: 2019-01-10

## 2019-05-06 PROBLEM — G45.9 TRANSIENT CEREBRAL ISCHEMIC ATTACK, UNSPECIFIED: Chronic | Status: ACTIVE | Noted: 2019-01-10

## 2019-05-07 ENCOUNTER — APPOINTMENT (OUTPATIENT)
Dept: HEART AND VASCULAR | Facility: CLINIC | Age: 84
End: 2019-05-07
Payer: MEDICARE

## 2019-05-07 VITALS — BODY MASS INDEX: 32.94 KG/M2 | HEIGHT: 61.81 IN | WEIGHT: 178.99 LBS

## 2019-05-07 PROCEDURE — 93015 CV STRESS TEST SUPVJ I&R: CPT

## 2019-05-07 PROCEDURE — 78452 HT MUSCLE IMAGE SPECT MULT: CPT

## 2019-05-07 PROCEDURE — A9500: CPT

## 2019-11-13 NOTE — DISCHARGE NOTE ADULT - PHYSICIAN SECTION COMPLETE
· Local wound care  · Bed / mattress changed 11/12/2019 to help with wounds  · Turn / Reposition frequently  Yes

## 2021-04-15 ENCOUNTER — APPOINTMENT (OUTPATIENT)
Dept: HEART AND VASCULAR | Facility: CLINIC | Age: 86
End: 2021-04-15
Payer: MEDICARE

## 2021-04-15 VITALS
SYSTOLIC BLOOD PRESSURE: 150 MMHG | HEART RATE: 92 BPM | WEIGHT: 169 LBS | DIASTOLIC BLOOD PRESSURE: 60 MMHG | OXYGEN SATURATION: 95 % | BODY MASS INDEX: 31.1 KG/M2

## 2021-04-15 DIAGNOSIS — Z01.810 ENCOUNTER FOR PREPROCEDURAL CARDIOVASCULAR EXAMINATION: ICD-10-CM

## 2021-04-15 PROCEDURE — 99214 OFFICE O/P EST MOD 30 MIN: CPT

## 2021-04-15 PROCEDURE — 93000 ELECTROCARDIOGRAM COMPLETE: CPT | Mod: NC

## 2021-04-15 RX ORDER — METOPROLOL SUCCINATE 25 MG/1
25 TABLET, EXTENDED RELEASE ORAL DAILY
Qty: 30 | Refills: 5 | Status: DISCONTINUED | COMMUNITY
Start: 2018-12-02 | End: 2021-04-15

## 2021-04-15 NOTE — PHYSICAL EXAM
[General Appearance - In No Acute Distress] : no acute distress [Normal Conjunctiva] : the conjunctiva exhibited no abnormalities [Normal Jugular Venous V Waves Present] : normal jugular venous V waves present [Respiration, Rhythm And Depth] : normal respiratory rhythm and effort [Auscultation Breath Sounds / Voice Sounds] : lungs were clear to auscultation bilaterally [Heart Rate And Rhythm] : heart rate and rhythm were normal [Heart Sounds] : normal S1 and S2 [Edema] : no peripheral edema present [Abdomen Soft] : soft [Abdomen Tenderness] : non-tender [Nail Clubbing] : no clubbing of the fingernails [Cyanosis, Localized] : no localized cyanosis [] : no rash

## 2021-04-15 NOTE — HISTORY OF PRESENT ILLNESS
[Preoperative Visit] : for a medical evaluation prior to surgery [Scheduled Procedure ___] : a [unfilled] [Date of Surgery ___] : on [unfilled] [Stable] : Stable [de-identified] : Zander Bryan ([facility]) [FreeTextEntry1] : Patient here for pre-operative evaluation prior to planned right hip replacement. \par She has had progressive right hip pain and associated disability, and is now severely limited in her activity.\par She was formerly (6-12 months ago) able to walk a few blocks without limitation.\par She has a hx of coronary artery disease, with a PCI to the LAD in 2017, and a subsequent PCI (with SEAN) to the ostium of the RCA in January of 2019. F/u stress MPI study on 5/7/19 was normal. \par Normal LV systolic function on MPI and on prior echocardiograms.\par No angina, dyspnea, palpitations, lightheadedness.\par No problem with prior surgeries on hands and remote hysterectomy

## 2022-05-14 NOTE — H&P ADULT. - HISTORY OF PRESENT ILLNESS
82 y/o F former smoker(quit 20 years ago), strong family h/o heart disease(daughter recently passed away from MI), HLD, DM, Breast CA s/p R lumpectomy in 2013, Uterine CA s/p ZEENAT in past, TIA in 2013(no residual deficits) who presented to her Cardiologist Dr. Stanton with increased PAYNE over past month.  Pt reports symptoms began 2-3 years ago and she had NST at that time which was normal but symptoms have worsened.  She was instructed to use an inhaler but has not picked inhaler up.  She reports symptoms occur with ambulation of 1 flight of stairs and are relieved with rest.  She denies any chest pain, palpitations, LE edema, PND, orthopnea.  She underwent a CTA PE protocol and LE duplexes last month which were negative for DVT/PE.  On 1/13/17 she underwent a Stress Echo which revealed LV wall motion nml, EF 60%, with LVEF to 45% with stress and stress induced wall motion with akinesis of apex, apical septum and mid inferoseptum and mid anteroseptal wall highly suggestive of exercise related ischemia in LAD distribution.     In light of patient's CCS Class III Anginal Equivalent symptoms in setting of abnormal Stress Echo she now presents for recommended Cardiac Catheterization with intervention if clinically indicated. - - -

## 2022-06-24 ENCOUNTER — APPOINTMENT (OUTPATIENT)
Dept: HEART AND VASCULAR | Facility: CLINIC | Age: 87
End: 2022-06-24
Payer: MEDICARE

## 2022-06-24 VITALS
HEIGHT: 61 IN | OXYGEN SATURATION: 96 % | BODY MASS INDEX: 30.58 KG/M2 | TEMPERATURE: 97.4 F | WEIGHT: 162 LBS | HEART RATE: 93 BPM | SYSTOLIC BLOOD PRESSURE: 108 MMHG | DIASTOLIC BLOOD PRESSURE: 87 MMHG

## 2022-06-24 VITALS — DIASTOLIC BLOOD PRESSURE: 80 MMHG | SYSTOLIC BLOOD PRESSURE: 160 MMHG

## 2022-06-24 VITALS — DIASTOLIC BLOOD PRESSURE: 74 MMHG | SYSTOLIC BLOOD PRESSURE: 140 MMHG

## 2022-06-24 DIAGNOSIS — R55 SYNCOPE AND COLLAPSE: ICD-10-CM

## 2022-06-24 DIAGNOSIS — R60.9 EDEMA, UNSPECIFIED: ICD-10-CM

## 2022-06-24 PROCEDURE — 99214 OFFICE O/P EST MOD 30 MIN: CPT

## 2022-06-24 PROCEDURE — 93000 ELECTROCARDIOGRAM COMPLETE: CPT

## 2022-06-24 RX ORDER — CLOPIDOGREL BISULFATE 75 MG/1
75 TABLET, FILM COATED ORAL DAILY
Qty: 90 | Refills: 3 | Status: DISCONTINUED | COMMUNITY
Start: 2017-06-22 | End: 2022-06-24

## 2022-06-24 RX ORDER — METFORMIN ER 500 MG 500 MG/1
500 TABLET ORAL
Qty: 720 | Refills: 1 | Status: ACTIVE | COMMUNITY
Start: 2022-06-06

## 2022-06-24 RX ORDER — BACLOFEN 10 MG/1
10 TABLET ORAL
Qty: 30 | Refills: 3 | Status: DISCONTINUED | COMMUNITY
Start: 2018-01-09 | End: 2022-06-24

## 2022-06-24 RX ORDER — METHYLPREDNISOLONE 4 MG/1
4 TABLET ORAL
Qty: 21 | Refills: 0 | Status: DISCONTINUED | COMMUNITY
Start: 2022-03-04 | End: 2022-06-24

## 2022-06-24 RX ORDER — TRAMADOL HYDROCHLORIDE 50 MG/1
50 TABLET, COATED ORAL
Qty: 30 | Refills: 0 | Status: DISCONTINUED | COMMUNITY
Start: 2022-03-11 | End: 2022-06-24

## 2022-06-24 RX ORDER — LISINOPRIL 10 MG/1
10 TABLET ORAL DAILY
Qty: 90 | Refills: 3 | Status: DISCONTINUED | COMMUNITY
Start: 2021-04-15 | End: 2022-06-24

## 2022-06-24 NOTE — REASON FOR VISIT
[FreeTextEntry1] : CAD s/p SEAN to LAD 1/18/17, pt had ostial RCA and LCx dz of 50 and 60% left alone. Reports some PAYNE, tory with stairs.  Getting more PAYNE.  Nuc stress test + in lateral wall.\par \par CCS of 385 in 2014           \par \par 6/24/222  Hip surgery went well in 2021. Here with LH, saw her local MD.  BP was high so med started by PCP.  LH got worse.  Had syncope 2 D/A, got dizzy, sweaty and passed out while standing in the kitchen.  Passed out yesterday again in the kitchen. Again had a prodrome.\par \par EKG: NSR, APCs,  normal axis and intervals, no ST-Tw abnormalities. 6/24/22\par \par \par \par \par EKG: NSR, normal axis and intervals, no ST-Tw abnormalities. 14//19

## 2022-06-24 NOTE — ASSESSMENT
[FreeTextEntry1] : Syncope- Hx c/w low BP from new med, she stopped it 2 days ago. ZIO monitor placed.  BP is back up to 165 systolic with 20 mm orthostasis. Will not start any meds but will have pt take home readings.  She will call in the name of the new medication.  Echo has NL LV function, mild to moderate MR but nothing that would cause syncope.\par \par ASHD- LAD SEAN 1/2017, will update Nuc stress as she reports PAYNE and had ostial RCA and LCx dz of 50-60%.  Nuclear stress + for lateral ischemia.  Having more Sxs on exertion.  Favor cath, pt agrees. SEAN to LAD in 2017 and SEAN oRCA 2019.\par \par HTN- BP up today but no changes\par \par HLD- labs drawn @ PCP\par \par DM- sees Podiatry and ophtho, diet poor, reports a high sugar.\par \par  \par \par

## 2022-06-24 NOTE — REVIEW OF SYSTEMS
[Syncope] : syncope [Joint Pain] : joint pain [Negative] : Heme/Lymph [Chest Discomfort] : no chest discomfort [Lower Ext Edema] : no extremity edema [Orthopnea] : no orthopnea [PND] : no PND [Muscle Cramps] : no muscle cramps

## 2022-06-24 NOTE — PHYSICAL EXAM
[General Appearance - Well Developed] : well developed [Normal Appearance] : normal appearance [Well Groomed] : well groomed [General Appearance - Well Nourished] : well nourished [No Deformities] : no deformities [General Appearance - In No Acute Distress] : no acute distress [Normal Conjunctiva] : the conjunctiva exhibited no abnormalities [Eyelids - No Xanthelasma] : the eyelids demonstrated no xanthelasmas [Normal Oral Mucosa] : normal oral mucosa [No Oral Pallor] : no oral pallor [No Oral Cyanosis] : no oral cyanosis [Respiration, Rhythm And Depth] : normal respiratory rhythm and effort [Exaggerated Use Of Accessory Muscles For Inspiration] : no accessory muscle use [Auscultation Breath Sounds / Voice Sounds] : lungs were clear to auscultation bilaterally [Heart Rate And Rhythm] : heart rate and rhythm were normal [Heart Sounds] : normal S1 and S2 [Murmurs] : no murmurs present [Abdomen Soft] : soft [Abdomen Tenderness] : non-tender [Abdomen Mass (___ Cm)] : no abdominal mass palpated [Abnormal Walk] : normal gait [Gait - Sufficient For Exercise Testing] : the gait was sufficient for exercise testing [Nail Clubbing] : no clubbing of the fingernails [Cyanosis, Localized] : no localized cyanosis [Petechial Hemorrhages (___cm)] : no petechial hemorrhages [] : no ischemic changes [Oriented To Time, Place, And Person] : oriented to person, place, and time [Affect] : the affect was normal [Mood] : the mood was normal [No Anxiety] : not feeling anxious [FreeTextEntry1] : , mild edema, + veins

## 2023-03-14 ENCOUNTER — APPOINTMENT (OUTPATIENT)
Dept: HEART AND VASCULAR | Facility: CLINIC | Age: 88
End: 2023-03-14
Payer: MEDICARE

## 2023-03-14 VITALS
TEMPERATURE: 97.2 F | HEART RATE: 90 BPM | SYSTOLIC BLOOD PRESSURE: 156 MMHG | OXYGEN SATURATION: 98 % | DIASTOLIC BLOOD PRESSURE: 82 MMHG

## 2023-03-14 VITALS — DIASTOLIC BLOOD PRESSURE: 60 MMHG | SYSTOLIC BLOOD PRESSURE: 188 MMHG

## 2023-03-14 VITALS — SYSTOLIC BLOOD PRESSURE: 174 MMHG | DIASTOLIC BLOOD PRESSURE: 70 MMHG

## 2023-03-14 DIAGNOSIS — M54.50 LOW BACK PAIN, UNSPECIFIED: ICD-10-CM

## 2023-03-14 PROCEDURE — 99214 OFFICE O/P EST MOD 30 MIN: CPT

## 2023-03-14 RX ORDER — CELECOXIB 100 MG/1
100 CAPSULE ORAL
Qty: 30 | Refills: 0 | Status: ACTIVE | COMMUNITY
Start: 2023-03-14

## 2023-03-14 RX ORDER — METOPROLOL SUCCINATE 25 MG/1
25 TABLET, EXTENDED RELEASE ORAL DAILY
Qty: 90 | Refills: 3 | Status: ACTIVE | COMMUNITY
Start: 2023-03-14 | End: 1900-01-01

## 2023-03-14 RX ORDER — ASPIRIN ENTERIC COATED TABLETS 81 MG 81 MG/1
81 TABLET, DELAYED RELEASE ORAL
Qty: 90 | Refills: 1 | Status: DISCONTINUED | COMMUNITY
Start: 2018-09-27 | End: 2023-03-14

## 2023-03-14 NOTE — ASSESSMENT
[FreeTextEntry1] : ASHD- CCS = 385 in 2014.  LAD SEAN 1/2017, will update Nuc stress as she reports PAYNE and had ostial RCA and LCx dz of 50-60%.  Nuclear stress + for lateral ischemia.  Having more Sxs on exertion.  Favor cath, pt agrees SEAN oRCA 2019.  Now with elevated BP and recurrence of Sxs. Will update Pharm Nuc prior to her going to [facility].\par \par HTN- BP up today and at [facility].  Could be D/T Celebrex.  Starting Toprol 25 mg\par \par HLD- On Statin\par \par DM- sees Podiatry and ophtho, diet poor, reports a high sugar.\par \par MR- Mild to moderate MR on echo \par \par Breast CA- Aromatase Inhibitor resumed by Dr Bromberg.

## 2023-03-14 NOTE — REASON FOR VISIT
[FreeTextEntry1] : 89 y/o F with CAD.  CRFs include HTN, HLD and DM. CAD s/p SEAN to LAD 1/18/17, pt had ostial RCA and LCx dz of 50 and 60% left alone.  .  Nuc stress test 2018 + in lateral wall. oRCA stent 2019. f/u Nuc stress NL in 2019. \par \par CCS of 385 in 2014           \par \par 3/14/23 [place] [facility] [place] Bromberg noted a BP of 200/90. Pt now on Celebrex and Anastrazole resumed by Dr Bromberg.  Pt also reported CP to Dr Bromberg who called me.\par \par \par \par EKG: NSR, normal axis and intervals, no ST-Tw abnormalities. 14//19

## 2023-03-21 ENCOUNTER — APPOINTMENT (OUTPATIENT)
Dept: HEART AND VASCULAR | Facility: CLINIC | Age: 88
End: 2023-03-21
Payer: MEDICARE

## 2023-03-21 VITALS — WEIGHT: 162 LBS | HEIGHT: 61 IN | BODY MASS INDEX: 30.58 KG/M2

## 2023-03-21 DIAGNOSIS — I10 ESSENTIAL (PRIMARY) HYPERTENSION: ICD-10-CM

## 2023-03-21 PROCEDURE — A9500: CPT

## 2023-03-21 PROCEDURE — 93015 CV STRESS TEST SUPVJ I&R: CPT

## 2023-03-21 PROCEDURE — 78452 HT MUSCLE IMAGE SPECT MULT: CPT

## 2024-04-09 NOTE — PATIENT PROFILE ADULT. - TEACHING/LEARNING LEARNING PREFERENCES
The Service to Hematology/Oncology order in workqueue [id number] requested on 2/29/2024 has been cancelled, due to inability to contact the patient.  If patient should still be seen, please contact patient to confirm/re-educate about referral and place a new order   individual instruction

## 2024-06-03 ENCOUNTER — NON-APPOINTMENT (OUTPATIENT)
Age: 89
End: 2024-06-03

## 2024-06-03 ENCOUNTER — APPOINTMENT (OUTPATIENT)
Dept: HEART AND VASCULAR | Facility: CLINIC | Age: 89
End: 2024-06-03
Payer: MEDICARE

## 2024-06-03 VITALS
HEART RATE: 90 BPM | TEMPERATURE: 97.9 F | HEIGHT: 61 IN | DIASTOLIC BLOOD PRESSURE: 72 MMHG | WEIGHT: 147 LBS | BODY MASS INDEX: 27.75 KG/M2 | SYSTOLIC BLOOD PRESSURE: 180 MMHG | OXYGEN SATURATION: 98 %

## 2024-06-03 DIAGNOSIS — R06.09 OTHER FORMS OF DYSPNEA: ICD-10-CM

## 2024-06-03 DIAGNOSIS — E11.9 TYPE 2 DIABETES MELLITUS W/OUT COMPLICATIONS: ICD-10-CM

## 2024-06-03 DIAGNOSIS — I10 ESSENTIAL (PRIMARY) HYPERTENSION: ICD-10-CM

## 2024-06-03 DIAGNOSIS — I42.2 OTHER HYPERTROPHIC CARDIOMYOPATHY: ICD-10-CM

## 2024-06-03 DIAGNOSIS — I25.10 ATHEROSCLEROTIC HEART DISEASE OF NATIVE CORONARY ARTERY W/OUT ANGINA PECTORIS: ICD-10-CM

## 2024-06-03 PROCEDURE — 93000 ELECTROCARDIOGRAM COMPLETE: CPT

## 2024-06-03 PROCEDURE — 36415 COLL VENOUS BLD VENIPUNCTURE: CPT

## 2024-06-03 PROCEDURE — 99214 OFFICE O/P EST MOD 30 MIN: CPT

## 2024-06-03 PROCEDURE — 93306 TTE W/DOPPLER COMPLETE: CPT

## 2024-06-04 DIAGNOSIS — R79.89 OTHER SPECIFIED ABNORMAL FINDINGS OF BLOOD CHEMISTRY: ICD-10-CM

## 2024-06-04 LAB
ALBUMIN SERPL ELPH-MCNC: 4.6 G/DL
ALP BLD-CCNC: 83 U/L
ALT SERPL-CCNC: 12 U/L
ANION GAP SERPL CALC-SCNC: 15 MMOL/L
AST SERPL-CCNC: 22 U/L
BASOPHILS # BLD AUTO: 0.07 K/UL
BASOPHILS NFR BLD AUTO: 0.6 %
BILIRUB SERPL-MCNC: <0.2 MG/DL
BUN SERPL-MCNC: 29 MG/DL
CALCIUM SERPL-MCNC: 10 MG/DL
CHLORIDE SERPL-SCNC: 106 MMOL/L
CHOLEST SERPL-MCNC: 159 MG/DL
CO2 SERPL-SCNC: 20 MMOL/L
CREAT SERPL-MCNC: 1.32 MG/DL
EGFR: 38 ML/MIN/1.73M2
EOSINOPHIL # BLD AUTO: 0.12 K/UL
EOSINOPHIL NFR BLD AUTO: 1.1 %
ESTIMATED AVERAGE GLUCOSE: 171 MG/DL
FERRITIN SERPL-MCNC: 15 NG/ML
GLUCOSE SERPL-MCNC: 135 MG/DL
HBA1C MFR BLD HPLC: 7.6 %
HCT VFR BLD CALC: 31.6 %
HDLC SERPL-MCNC: 75 MG/DL
HGB BLD-MCNC: 9.2 G/DL
IMM GRANULOCYTES NFR BLD AUTO: 0.3 %
IRON SATN MFR SERPL: 6 %
IRON SERPL-MCNC: 24 UG/DL
LDLC SERPL CALC-MCNC: 63 MG/DL
LYMPHOCYTES # BLD AUTO: 2.61 K/UL
LYMPHOCYTES NFR BLD AUTO: 23.7 %
MAN DIFF?: NORMAL
MCHC RBC-ENTMCNC: 26.4 PG
MCHC RBC-ENTMCNC: 29.1 GM/DL
MCV RBC AUTO: 90.5 FL
MONOCYTES # BLD AUTO: 0.64 K/UL
MONOCYTES NFR BLD AUTO: 5.8 %
NEUTROPHILS # BLD AUTO: 7.53 K/UL
NEUTROPHILS NFR BLD AUTO: 68.5 %
NONHDLC SERPL-MCNC: 84 MG/DL
NT-PROBNP SERPL-MCNC: 1973 PG/ML
PLATELET # BLD AUTO: 459 K/UL
POTASSIUM SERPL-SCNC: 5 MMOL/L
PROT SERPL-MCNC: 7.3 G/DL
RBC # BLD: 3.49 M/UL
RBC # FLD: 16.6 %
SODIUM SERPL-SCNC: 140 MMOL/L
TIBC SERPL-MCNC: 430 UG/DL
TRIGL SERPL-MCNC: 125 MG/DL
UIBC SERPL-MCNC: 406 UG/DL
WBC # FLD AUTO: 11 K/UL

## 2024-06-04 RX ORDER — AMOXICILLIN AND CLAVULANATE POTASSIUM 500; 125 MG/1; MG/1
500-125 TABLET, FILM COATED ORAL
Qty: 20 | Refills: 0 | Status: DISCONTINUED | COMMUNITY
Start: 2023-12-09 | End: 2024-06-04

## 2024-06-04 RX ORDER — FUROSEMIDE 20 MG/1
20 TABLET ORAL
Qty: 26 | Refills: 3 | Status: ACTIVE | COMMUNITY
Start: 2024-06-04 | End: 1900-01-01

## 2024-06-04 RX ORDER — AMLODIPINE BESYLATE 5 MG/1
5 TABLET ORAL
Qty: 90 | Refills: 1 | Status: ACTIVE | COMMUNITY
Start: 2024-05-13 | End: 1900-01-01

## 2024-06-04 RX ORDER — ABEMACICLIB 50 MG/1
50 TABLET ORAL
Qty: 56 | Refills: 0 | Status: DISCONTINUED | COMMUNITY
Start: 2023-12-11 | End: 2024-06-04

## 2024-06-04 RX ORDER — METRONIDAZOLE 500 MG/1
500 TABLET ORAL
Qty: 12 | Refills: 0 | Status: DISCONTINUED | COMMUNITY
Start: 2023-12-15 | End: 2024-06-04

## 2024-06-04 RX ORDER — CEFUROXIME AXETIL 250 MG/1
250 TABLET ORAL
Qty: 12 | Refills: 0 | Status: DISCONTINUED | COMMUNITY
Start: 2023-12-15 | End: 2024-06-04

## 2024-06-04 NOTE — ASSESSMENT
[FreeTextEntry1] : ASHD- CCS = 385 in 2014.  LAD SEAN 1/2017, will update Nuc stress as she reports PAYNE and had ostial RCA and LCx dz of 50-60%.  Nuclear stress + for lateral ischemia.  Having more Sxs on exertion.  Favor cath, pt agrees SEAN oRCA 2019.  Now with elevated BP and recurrence of Sxs. Will update Pharm Nuc  WNL 3/2023.  PAYNE- EKG stable, echo updated today and unchanged from 2022, mild to moderate MR and PAP 45 mm Hg. Labs were drawn today in Office. Anemia confirmed, iron is low.  HTN- BP up today and at [facility].  Could be D/T Celebrex.  Starting Toprol 25 mg.  Pt not clear on what pills she is taking.  Amlodipine increased to 5 mg and BNP elevated [id number].  Lasix 20 mg started Mon and Friday.  HLD- On Statin  DM- sees Podiatry and ophtho, diet poor, reports a high sugar.  MR- Mild to moderate MR on echo is unchanged  Breast CA- Aromatase Inhibitor resumed by Dr Bromberg.

## 2024-06-04 NOTE — REASON FOR VISIT
[FreeTextEntry1] : 89 y/o F with CAD.  CRFs include HTN, HLD and DM. CAD s/p SEAN to LAD 1/18/17, pt had ostial RCA and LCx dz of 50 and 60% left alone.  .  Nuc stress test 2018 + in lateral wall. oRCA stent 2019. f/u Nuc stress NL in 2019.   CCS of 385 in 2014             3/14/23 [facility] Bromberg noted a BP of 200/90. Pt now on Celebrex and Anastrazole resumed by Dr Bromberg.  Pt also reported CP to Dr Bromberg who called me. 6/3/24 PAYNE, slightly worse that last year.  Told she is anemic, seen in 2022. Spoke with daughter, pt was very sick Dec 2023 with cholecystitis.  She has had a recurrence of Breast CA and is being treated [facility]. Pt did not tolerate Verzenio at that time.  Pt is not taking Metoprolol nor Celebrex as per the daughter.    EKG: NSR, normal axis and intervals, no ST-Tw abnormalities. 14//19

## 2024-07-09 NOTE — PROGRESS NOTE ADULT - NS NEC GEN PE MLT EXAM PC
[facility] PROFESSIONAL SERVS  [facility]  [address] [id number]  [phone]     Ya Sheffield is a 57 y.o. female who presents today for:  Chief Complaint   Patient presents with    Urinary Frequency     Started last night. Took 2 cranberry pills.     Urinary Pain    Hematuria       Assessment/Plan:     Ya was seen today for urinary frequency, urinary pain and hematuria.    Diagnoses and all orders for this visit:    Urinary frequency  -     POCT Urinalysis No Micro (Auto)    Acute cystitis with hematuria  -     Culture, Urine  -     nitrofurantoin, macrocrystal-monohydrate, (MACROBID) 100 MG capsule; Take 1 capsule by mouth 2 times daily for 5 days        UTI: Acute/uncontrolled, UA consistent with UTI with large leuks, blood, nitrite.  Macrobid as above and will send for culture.       No follow-ups on file.      Medications Prescribed:  Orders Placed This Encounter   Medications    nitrofurantoin, macrocrystal-monohydrate, (MACROBID) 100 MG capsule     Sig: Take 1 capsule by mouth 2 times daily for 5 days     Dispense:  10 capsule     Refill:  0       No future appointments.    HPI:     HPI  57-year-old female with past medical history significant for anxiety, goiter, post surgical hypothyroid who presents for a walk-in visit for UTI symptoms.    Patient states that she has had urinary frequency that started last night around 5 PM.  She took 2 cranberry pills.  She is also having a little bit of urinary pain.  Her next urination this morning had lots of blood in it.    No real abdominal pain, back pain.  No fevers.  Does not get UTIs frequency.  No recent antibiotic use    Subjective:      Review of Systems   Constitutional:  Negative for fatigue and fever.   Respiratory:  Negative for cough and shortness of breath.    Cardiovascular:  Negative for chest pain and leg swelling.   Gastrointestinal:  Negative for constipation, diarrhea, nausea and  detailed exam

## 2025-01-13 NOTE — PATIENT PROFILE ADULT. - NS SC CAGE ALCOHOL EYE OPENER
Jyotsna Rogers routed conversation to JAMIE Flower Triage Nurse Msg Pool2 minutes ago (8:45 AM)     Maggy POTTS [facility] Recept Message Pool2 days ago     Appointment canceled for Maggy Dominguez ([id number])  Visit type: OFFICE VISIT  1/15/2025 10:30 AM (30 minutes) with Kelsi Flower MD in [facility] DERMATOLOGY     Reason for cancellation: Conflict with day or time     Patient comments: I will need to reschedule.   no

## 2025-07-02 ENCOUNTER — APPOINTMENT (OUTPATIENT)
Dept: HEART AND VASCULAR | Facility: CLINIC | Age: 89
End: 2025-07-02
Payer: MEDICARE

## 2025-07-02 ENCOUNTER — NON-APPOINTMENT (OUTPATIENT)
Age: 89
End: 2025-07-02

## 2025-07-02 VITALS
SYSTOLIC BLOOD PRESSURE: 162 MMHG | BODY MASS INDEX: 27.75 KG/M2 | DIASTOLIC BLOOD PRESSURE: 70 MMHG | HEIGHT: 61 IN | WEIGHT: 147 LBS | TEMPERATURE: 98 F | OXYGEN SATURATION: 94 % | HEART RATE: 91 BPM

## 2025-07-02 VITALS — SYSTOLIC BLOOD PRESSURE: 146 MMHG | DIASTOLIC BLOOD PRESSURE: 80 MMHG

## 2025-07-02 PROCEDURE — 93306 TTE W/DOPPLER COMPLETE: CPT

## 2025-07-02 PROCEDURE — 93000 ELECTROCARDIOGRAM COMPLETE: CPT

## 2025-07-02 PROCEDURE — 99214 OFFICE O/P EST MOD 30 MIN: CPT

## 2025-07-02 PROCEDURE — 36415 COLL VENOUS BLD VENIPUNCTURE: CPT

## 2025-07-02 RX ORDER — AMLODIPINE BESYLATE 2.5 MG/1
2.5 TABLET ORAL DAILY
Refills: 0 | Status: ACTIVE | COMMUNITY

## 2025-07-02 RX ORDER — ASPIRIN 81 MG/1
81 TABLET, COATED ORAL
Refills: 0 | Status: ACTIVE | COMMUNITY

## 2025-07-02 RX ORDER — SITAGLIPTIN 25 MG/1
25 TABLET, FILM COATED ORAL
Refills: 0 | Status: ACTIVE | COMMUNITY

## 2025-07-02 RX ORDER — ELACESTRANT 86 MG/1
86 TABLET, FILM COATED ORAL
Refills: 0 | Status: ACTIVE | COMMUNITY

## 2025-07-09 ENCOUNTER — APPOINTMENT (OUTPATIENT)
Dept: HEART AND VASCULAR | Facility: CLINIC | Age: 89
End: 2025-07-09
Payer: MEDICARE

## 2025-07-09 VITALS
BODY MASS INDEX: 27.75 KG/M2 | HEART RATE: 95 BPM | OXYGEN SATURATION: 94 % | SYSTOLIC BLOOD PRESSURE: 140 MMHG | HEIGHT: 61 IN | TEMPERATURE: 98.2 F | WEIGHT: 147 LBS | DIASTOLIC BLOOD PRESSURE: 70 MMHG

## 2025-07-09 PROBLEM — J90 PLEURAL EFFUSION: Status: ACTIVE | Noted: 2025-07-09

## 2025-07-09 PROBLEM — I31.39 PERICARDIAL EFFUSION: Status: ACTIVE | Noted: 2025-07-09

## 2025-07-09 PROCEDURE — 99213 OFFICE O/P EST LOW 20 MIN: CPT

## 2025-07-09 PROCEDURE — 36415 COLL VENOUS BLD VENIPUNCTURE: CPT

## 2025-07-09 PROCEDURE — 93308 TTE F-UP OR LMTD: CPT

## 2025-07-10 ENCOUNTER — NON-APPOINTMENT (OUTPATIENT)
Age: 89
End: 2025-07-10

## 2025-07-10 LAB
ANION GAP SERPL CALC-SCNC: 15 MMOL/L
BUN SERPL-MCNC: 30 MG/DL
CALCIUM SERPL-MCNC: 9.8 MG/DL
CHLORIDE SERPL-SCNC: 107 MMOL/L
CO2 SERPL-SCNC: 21 MMOL/L
CREAT SERPL-MCNC: 1.61 MG/DL
EGFRCR SERPLBLD CKD-EPI 2021: 30 ML/MIN/1.73M2
GLUCOSE SERPL-MCNC: 135 MG/DL
NT-PROBNP SERPL-MCNC: 2065 PG/ML
POTASSIUM SERPL-SCNC: 5.4 MMOL/L
SODIUM SERPL-SCNC: 142 MMOL/L

## 2025-08-18 ENCOUNTER — APPOINTMENT (OUTPATIENT)
Dept: HEART AND VASCULAR | Facility: CLINIC | Age: 89
End: 2025-08-18

## 2025-08-21 ENCOUNTER — APPOINTMENT (OUTPATIENT)
Dept: HEART AND VASCULAR | Facility: CLINIC | Age: 89
End: 2025-08-21

## 2025-08-21 ENCOUNTER — LABORATORY RESULT (OUTPATIENT)
Age: 89
End: 2025-08-21

## 2025-08-21 VITALS
OXYGEN SATURATION: 96 % | HEART RATE: 95 BPM | DIASTOLIC BLOOD PRESSURE: 54 MMHG | TEMPERATURE: 98 F | BODY MASS INDEX: 27.75 KG/M2 | SYSTOLIC BLOOD PRESSURE: 162 MMHG | WEIGHT: 147 LBS | HEIGHT: 61 IN

## 2025-08-21 DIAGNOSIS — I42.2 OTHER HYPERTROPHIC CARDIOMYOPATHY: ICD-10-CM

## 2025-08-21 DIAGNOSIS — R42 DIZZINESS AND GIDDINESS: ICD-10-CM

## 2025-08-21 DIAGNOSIS — R06.09 OTHER FORMS OF DYSPNEA: ICD-10-CM

## 2025-08-21 PROCEDURE — 36415 COLL VENOUS BLD VENIPUNCTURE: CPT

## 2025-08-21 PROCEDURE — 99213 OFFICE O/P EST LOW 20 MIN: CPT

## 2025-08-21 RX ORDER — SITAGLIPTIN 50 MG/1
50 TABLET, FILM COATED ORAL
Qty: 90 | Refills: 0 | Status: ACTIVE | COMMUNITY
Start: 2025-08-05

## 2025-08-21 RX ORDER — CAPECITABINE 500 MG/1
TABLET, FILM COATED ORAL
Refills: 0 | Status: ACTIVE | COMMUNITY

## 2025-08-24 LAB
ANION GAP SERPL CALC-SCNC: 20 MMOL/L
BUN SERPL-MCNC: 28 MG/DL
CALCIUM SERPL-MCNC: 9.4 MG/DL
CHLORIDE SERPL-SCNC: 106 MMOL/L
CO2 SERPL-SCNC: 16 MMOL/L
CREAT SERPL-MCNC: 1.42 MG/DL
EGFRCR SERPLBLD CKD-EPI 2021: 35 ML/MIN/1.73M2
ESTIMATED AVERAGE GLUCOSE: 180 MG/DL
GLUCOSE SERPL-MCNC: 240 MG/DL
HBA1C MFR BLD HPLC: 7.9 %
HCT VFR BLD CALC: 33 %
HGB BLD-MCNC: 10.2 G/DL
MCHC RBC-ENTMCNC: 28.9 PG
MCHC RBC-ENTMCNC: 30.9 G/DL
MCV RBC AUTO: 93.5 FL
NT-PROBNP SERPL-MCNC: 1657 PG/ML
PLATELET # BLD AUTO: 339 K/UL
POTASSIUM SERPL-SCNC: 4.8 MMOL/L
RBC # BLD: 3.53 M/UL
RBC # FLD: 17 %
SODIUM SERPL-SCNC: 142 MMOL/L
TSH SERPL-ACNC: 4.41 UIU/ML
WBC # FLD AUTO: 7.5 K/UL

## 2025-09-03 ENCOUNTER — APPOINTMENT (OUTPATIENT)
Dept: HEART FAILURE | Facility: CLINIC | Age: 89
End: 2025-09-03